# Patient Record
Sex: MALE | Race: BLACK OR AFRICAN AMERICAN | Employment: UNEMPLOYED | URBAN - METROPOLITAN AREA
[De-identification: names, ages, dates, MRNs, and addresses within clinical notes are randomized per-mention and may not be internally consistent; named-entity substitution may affect disease eponyms.]

---

## 2020-05-08 ENCOUNTER — HOSPITAL ENCOUNTER (INPATIENT)
Age: 35
LOS: 2 days | Discharge: HOME | DRG: 420 | End: 2020-05-10
Attending: EMERGENCY MEDICINE | Admitting: INTERNAL MEDICINE
Payer: COMMERCIAL

## 2020-05-08 PROBLEM — E10.10 DKA, TYPE 1, NOT AT GOAL (HCC): Status: ACTIVE | Noted: 2020-05-08

## 2020-05-08 LAB
-: ABNORMAL
-: NORMAL
ABSOLUTE EOS #: 0.07 K/UL (ref 0–0.4)
ABSOLUTE IMMATURE GRANULOCYTE: ABNORMAL K/UL (ref 0–0.3)
ABSOLUTE LYMPH #: 2.05 K/UL (ref 1–4.8)
ABSOLUTE MONO #: 0.59 K/UL (ref 0.1–1.3)
ALBUMIN SERPL-MCNC: ABNORMAL G/DL (ref 3.5–5.2)
ALBUMIN/GLOBULIN RATIO: ABNORMAL (ref 1–2.5)
ALP BLD-CCNC: ABNORMAL U/L (ref 40–129)
ALT SERPL-CCNC: ABNORMAL U/L (ref 5–41)
AMORPHOUS: ABNORMAL
ANION GAP SERPL CALCULATED.3IONS-SCNC: 17 MMOL/L (ref 9–17)
ANION GAP SERPL CALCULATED.3IONS-SCNC: 25 MMOL/L (ref 9–17)
AST SERPL-CCNC: ABNORMAL U/L
BACTERIA: ABNORMAL
BASOPHILS # BLD: 1 % (ref 0–2)
BASOPHILS ABSOLUTE: 0.07 K/UL (ref 0–0.2)
BETA-HYDROXYBUTYRATE: 4.29 MMOL/L (ref 0.02–0.27)
BILIRUB SERPL-MCNC: ABNORMAL MG/DL (ref 0.3–1.2)
BILIRUBIN URINE: NEGATIVE
BUN BLDV-MCNC: 10 MG/DL (ref 6–20)
BUN BLDV-MCNC: 10 MG/DL (ref 6–20)
BUN/CREAT BLD: ABNORMAL (ref 9–20)
BUN/CREAT BLD: ABNORMAL (ref 9–20)
CALCIUM SERPL-MCNC: 8.7 MG/DL (ref 8.6–10.4)
CALCIUM SERPL-MCNC: 9.8 MG/DL (ref 8.6–10.4)
CASTS UA: ABNORMAL /LPF
CHLORIDE BLD-SCNC: 101 MMOL/L (ref 98–107)
CHLORIDE BLD-SCNC: 95 MMOL/L (ref 98–107)
CO2: 15 MMOL/L (ref 20–31)
CO2: 15 MMOL/L (ref 20–31)
COLOR: YELLOW
COMMENT UA: ABNORMAL
CREAT SERPL-MCNC: 0.84 MG/DL (ref 0.7–1.2)
CREAT SERPL-MCNC: 1.15 MG/DL (ref 0.7–1.2)
CRYSTALS, UA: ABNORMAL /HPF
DIFFERENTIAL TYPE: ABNORMAL
EOSINOPHILS RELATIVE PERCENT: 1 % (ref 0–4)
EPITHELIAL CELLS UA: ABNORMAL /HPF
GFR AFRICAN AMERICAN: >60 ML/MIN
GFR AFRICAN AMERICAN: >60 ML/MIN
GFR NON-AFRICAN AMERICAN: >60 ML/MIN
GFR NON-AFRICAN AMERICAN: >60 ML/MIN
GFR SERPL CREATININE-BSD FRML MDRD: ABNORMAL ML/MIN/{1.73_M2}
GLUCOSE BLD-MCNC: 144 MG/DL (ref 75–110)
GLUCOSE BLD-MCNC: 145 MG/DL (ref 70–99)
GLUCOSE BLD-MCNC: 164 MG/DL (ref 75–110)
GLUCOSE BLD-MCNC: 164 MG/DL (ref 75–110)
GLUCOSE BLD-MCNC: 174 MG/DL (ref 75–110)
GLUCOSE BLD-MCNC: 177 MG/DL (ref 75–110)
GLUCOSE BLD-MCNC: 201 MG/DL
GLUCOSE BLD-MCNC: 201 MG/DL (ref 75–110)
GLUCOSE BLD-MCNC: 234 MG/DL (ref 75–110)
GLUCOSE BLD-MCNC: 306 MG/DL (ref 70–99)
GLUCOSE URINE: ABNORMAL
HCT VFR BLD CALC: 40.5 % (ref 41–53)
HEMOGLOBIN: 13.6 G/DL (ref 13.5–17.5)
IMMATURE GRANULOCYTES: ABNORMAL %
KETONES, URINE: ABNORMAL
LEUKOCYTE ESTERASE, URINE: ABNORMAL
LYMPHOCYTES # BLD: 31 % (ref 24–44)
MAGNESIUM: 1.9 MG/DL (ref 1.6–2.6)
MCH RBC QN AUTO: 26.3 PG (ref 26–34)
MCHC RBC AUTO-ENTMCNC: 33.6 G/DL (ref 31–37)
MCV RBC AUTO: 78.3 FL (ref 80–100)
MONOCYTES # BLD: 9 % (ref 1–7)
MORPHOLOGY: ABNORMAL
MUCUS: ABNORMAL
NITRITE, URINE: NEGATIVE
NRBC AUTOMATED: ABNORMAL PER 100 WBC
OTHER OBSERVATIONS UA: ABNORMAL
PDW BLD-RTO: 14.7 % (ref 11.5–14.9)
PH UA: 5.5 (ref 5–8)
PHOSPHORUS: NORMAL MG/DL (ref 2.5–4.5)
PLATELET # BLD: 281 K/UL (ref 150–450)
PLATELET ESTIMATE: ABNORMAL
PMV BLD AUTO: 9.1 FL (ref 6–12)
POTASSIUM SERPL-SCNC: 4.9 MMOL/L (ref 3.7–5.3)
POTASSIUM SERPL-SCNC: 6 MMOL/L (ref 3.7–5.3)
PROTEIN UA: ABNORMAL
RBC # BLD: 5.17 M/UL (ref 4.5–5.9)
RBC # BLD: ABNORMAL 10*6/UL
RBC UA: ABNORMAL /HPF
REASON FOR REJECTION: NORMAL
RENAL EPITHELIAL, UA: ABNORMAL /HPF
SEG NEUTROPHILS: 58 % (ref 36–66)
SEGMENTED NEUTROPHILS ABSOLUTE COUNT: 3.82 K/UL (ref 1.3–9.1)
SODIUM BLD-SCNC: 133 MMOL/L (ref 135–144)
SODIUM BLD-SCNC: 135 MMOL/L (ref 135–144)
SPECIFIC GRAVITY UA: 1.04 (ref 1–1.03)
TOTAL PROTEIN: ABNORMAL G/DL (ref 6.4–8.3)
TRICHOMONAS: ABNORMAL
TURBIDITY: ABNORMAL
URINE HGB: NEGATIVE
UROBILINOGEN, URINE: NORMAL
WBC # BLD: 6.6 K/UL (ref 3.5–11)
WBC # BLD: ABNORMAL 10*3/UL
WBC UA: ABNORMAL /HPF
YEAST: ABNORMAL
ZZ NTE CLEAN UP: ORDERED TEST: NORMAL
ZZ NTE WITH NAME CLEAN UP: SPECIMEN SOURCE: NORMAL

## 2020-05-08 PROCEDURE — 82947 ASSAY GLUCOSE BLOOD QUANT: CPT

## 2020-05-08 PROCEDURE — 2500000003 HC RX 250 WO HCPCS: Performed by: STUDENT IN AN ORGANIZED HEALTH CARE EDUCATION/TRAINING PROGRAM

## 2020-05-08 PROCEDURE — 83735 ASSAY OF MAGNESIUM: CPT

## 2020-05-08 PROCEDURE — 81001 URINALYSIS AUTO W/SCOPE: CPT

## 2020-05-08 PROCEDURE — 99285 EMERGENCY DEPT VISIT HI MDM: CPT

## 2020-05-08 PROCEDURE — 6360000002 HC RX W HCPCS: Performed by: PHYSICIAN ASSISTANT

## 2020-05-08 PROCEDURE — 6360000002 HC RX W HCPCS: Performed by: STUDENT IN AN ORGANIZED HEALTH CARE EDUCATION/TRAINING PROGRAM

## 2020-05-08 PROCEDURE — 6370000000 HC RX 637 (ALT 250 FOR IP): Performed by: EMERGENCY MEDICINE

## 2020-05-08 PROCEDURE — 85025 COMPLETE CBC W/AUTO DIFF WBC: CPT

## 2020-05-08 PROCEDURE — 82010 KETONE BODYS QUAN: CPT

## 2020-05-08 PROCEDURE — 2000000000 HC ICU R&B

## 2020-05-08 PROCEDURE — 87086 URINE CULTURE/COLONY COUNT: CPT

## 2020-05-08 PROCEDURE — 6370000000 HC RX 637 (ALT 250 FOR IP): Performed by: STUDENT IN AN ORGANIZED HEALTH CARE EDUCATION/TRAINING PROGRAM

## 2020-05-08 PROCEDURE — 99223 1ST HOSP IP/OBS HIGH 75: CPT | Performed by: INTERNAL MEDICINE

## 2020-05-08 PROCEDURE — 36415 COLL VENOUS BLD VENIPUNCTURE: CPT

## 2020-05-08 PROCEDURE — 84100 ASSAY OF PHOSPHORUS: CPT

## 2020-05-08 PROCEDURE — 2580000003 HC RX 258: Performed by: STUDENT IN AN ORGANIZED HEALTH CARE EDUCATION/TRAINING PROGRAM

## 2020-05-08 PROCEDURE — 2580000003 HC RX 258: Performed by: EMERGENCY MEDICINE

## 2020-05-08 PROCEDURE — 80048 BASIC METABOLIC PNL TOTAL CA: CPT

## 2020-05-08 PROCEDURE — 80053 COMPREHEN METABOLIC PANEL: CPT

## 2020-05-08 PROCEDURE — 83036 HEMOGLOBIN GLYCOSYLATED A1C: CPT

## 2020-05-08 RX ORDER — DEXTROSE MONOHYDRATE 25 G/50ML
12.5 INJECTION, SOLUTION INTRAVENOUS PRN
Status: DISCONTINUED | OUTPATIENT
Start: 2020-05-08 | End: 2020-05-10 | Stop reason: HOSPADM

## 2020-05-08 RX ORDER — 0.9 % SODIUM CHLORIDE 0.9 %
15 INTRAVENOUS SOLUTION INTRAVENOUS ONCE
Status: DISCONTINUED | OUTPATIENT
Start: 2020-05-08 | End: 2020-05-10 | Stop reason: HOSPADM

## 2020-05-08 RX ORDER — 0.9 % SODIUM CHLORIDE 0.9 %
1000 INTRAVENOUS SOLUTION INTRAVENOUS ONCE
Status: COMPLETED | OUTPATIENT
Start: 2020-05-08 | End: 2020-05-08

## 2020-05-08 RX ORDER — HYDRALAZINE HYDROCHLORIDE 20 MG/ML
10 INJECTION INTRAMUSCULAR; INTRAVENOUS EVERY 6 HOURS PRN
Status: DISCONTINUED | OUTPATIENT
Start: 2020-05-08 | End: 2020-05-09

## 2020-05-08 RX ORDER — DEXTROSE AND SODIUM CHLORIDE 5; .45 G/100ML; G/100ML
INJECTION, SOLUTION INTRAVENOUS CONTINUOUS PRN
Status: DISCONTINUED | OUTPATIENT
Start: 2020-05-08 | End: 2020-05-10 | Stop reason: HOSPADM

## 2020-05-08 RX ORDER — SODIUM CHLORIDE 9 MG/ML
INJECTION, SOLUTION INTRAVENOUS CONTINUOUS
Status: DISCONTINUED | OUTPATIENT
Start: 2020-05-08 | End: 2020-05-10 | Stop reason: HOSPADM

## 2020-05-08 RX ORDER — POTASSIUM CHLORIDE 7.45 MG/ML
10 INJECTION INTRAVENOUS PRN
Status: DISCONTINUED | OUTPATIENT
Start: 2020-05-08 | End: 2020-05-10 | Stop reason: HOSPADM

## 2020-05-08 RX ORDER — DEXTROSE MONOHYDRATE 50 MG/ML
100 INJECTION, SOLUTION INTRAVENOUS PRN
Status: DISCONTINUED | OUTPATIENT
Start: 2020-05-08 | End: 2020-05-10 | Stop reason: HOSPADM

## 2020-05-08 RX ORDER — NICOTINE POLACRILEX 4 MG
15 LOZENGE BUCCAL PRN
Status: DISCONTINUED | OUTPATIENT
Start: 2020-05-08 | End: 2020-05-10 | Stop reason: HOSPADM

## 2020-05-08 RX ORDER — ACETAMINOPHEN 325 MG/1
650 TABLET ORAL EVERY 4 HOURS PRN
Status: DISCONTINUED | OUTPATIENT
Start: 2020-05-08 | End: 2020-05-10 | Stop reason: HOSPADM

## 2020-05-08 RX ORDER — MAGNESIUM SULFATE 1 G/100ML
1 INJECTION INTRAVENOUS PRN
Status: DISCONTINUED | OUTPATIENT
Start: 2020-05-08 | End: 2020-05-10 | Stop reason: HOSPADM

## 2020-05-08 RX ADMIN — ENOXAPARIN SODIUM 30 MG: 30 INJECTION SUBCUTANEOUS at 20:26

## 2020-05-08 RX ADMIN — HYDRALAZINE HYDROCHLORIDE 10 MG: 20 INJECTION INTRAMUSCULAR; INTRAVENOUS at 23:00

## 2020-05-08 RX ADMIN — METRONIDAZOLE 2000 MG: 500 INJECTION, SOLUTION INTRAVENOUS at 20:26

## 2020-05-08 RX ADMIN — DEXTROSE AND SODIUM CHLORIDE: 5; 450 INJECTION, SOLUTION INTRAVENOUS at 18:29

## 2020-05-08 RX ADMIN — SODIUM CHLORIDE 0.1 UNITS/KG/HR: 9 INJECTION, SOLUTION INTRAVENOUS at 16:21

## 2020-05-08 RX ADMIN — SODIUM CHLORIDE 1000 ML: 9 INJECTION, SOLUTION INTRAVENOUS at 13:29

## 2020-05-08 RX ADMIN — Medication 3.12 UNITS/HR: at 18:29

## 2020-05-08 RX ADMIN — DEXTROSE MONOHYDRATE 100 ML/HR: 50 INJECTION, SOLUTION INTRAVENOUS at 16:21

## 2020-05-08 ASSESSMENT — ENCOUNTER SYMPTOMS
SORE THROAT: 0
COUGH: 0
FACIAL SWELLING: 0
RHINORRHEA: 0
CONSTIPATION: 0
CHEST TIGHTNESS: 0
EYE DISCHARGE: 0
EYE REDNESS: 0
BLOOD IN STOOL: 0
DIARRHEA: 0
EYE PAIN: 0
TROUBLE SWALLOWING: 0
VOMITING: 0
COLOR CHANGE: 0
BACK PAIN: 0
WHEEZING: 0
SINUS PRESSURE: 0
ABDOMINAL PAIN: 0
NAUSEA: 0
SHORTNESS OF BREATH: 0

## 2020-05-08 ASSESSMENT — PAIN SCALES - GENERAL: PAINLEVEL_OUTOF10: 0

## 2020-05-08 NOTE — H&P
1:24 PM   Result Value Ref Range    WBC 6.6 3.5 - 11.0 k/uL    RBC 5.17 4.5 - 5.9 m/uL    Hemoglobin 13.6 13.5 - 17.5 g/dL    Hematocrit 40.5 (L) 41 - 53 %    MCV 78.3 (L) 80 - 100 fL    MCH 26.3 26 - 34 pg    MCHC 33.6 31 - 37 g/dL    RDW 14.7 11.5 - 14.9 %    Platelets 030 719 - 637 k/uL    MPV 9.1 6.0 - 12.0 fL    NRBC Automated NOT REPORTED per 100 WBC    Differential Type NOT REPORTED     Immature Granulocytes NOT REPORTED 0 %    Absolute Immature Granulocyte NOT REPORTED 0.00 - 0.30 k/uL    WBC Morphology NOT REPORTED     RBC Morphology NOT REPORTED     Platelet Estimate NOT REPORTED     Seg Neutrophils 58 36 - 66 %    Lymphocytes 31 24 - 44 %    Monocytes 9 (H) 1 - 7 %    Eosinophils % 1 0 - 4 %    Basophils 1 0 - 2 %    Segs Absolute 3.82 1.3 - 9.1 k/uL    Absolute Lymph # 2.05 1.0 - 4.8 k/uL    Absolute Mono # 0.59 0.1 - 1.3 k/uL    Absolute Eos # 0.07 0.0 - 0.4 k/uL    Basophils Absolute 0.07 0.0 - 0.2 k/uL    Morphology FEW GIANT PLATELETS    Comprehensive Metabolic Panel    Collection Time: 05/08/20  1:24 PM   Result Value Ref Range    Glucose 306 (H) 70 - 99 mg/dL    BUN 10 6 - 20 mg/dL    CREATININE 1.15 0.70 - 1.20 mg/dL    Bun/Cre Ratio NOT REPORTED 9 - 20    Calcium 9.8 8.6 - 10.4 mg/dL    Sodium 135 135 - 144 mmol/L    Potassium 6.0 (HH) 3.7 - 5.3 mmol/L    Chloride 95 (L) 98 - 107 mmol/L    CO2 15 (L) 20 - 31 mmol/L    Anion Gap 25 (H) 9 - 17 mmol/L    Alkaline Phosphatase NOT REPORTED 40 - 129 U/L    ALT NOT REPORTED 5 - 41 U/L    AST NOT REPORTED <40 U/L    Total Bilirubin NOT REPORTED 0.3 - 1.2 mg/dL    Total Protein NOT REPORTED 6.4 - 8.3 g/dL    Alb NOT REPORTED 3.5 - 5.2 g/dL    Albumin/Globulin Ratio NOT REPORTED 1.0 - 2.5    GFR Non-African American >60 >60 mL/min    GFR African American >60 >60 mL/min    GFR Comment          GFR Staging NOT REPORTED    Beta-Hydroxybutyrate    Collection Time: 05/08/20  1:24 PM   Result Value Ref Range    Beta-Hydroxybutyrate 4.29 (H) 0.02 - 0.27 mmol/L POC Glucose Fingerstick    Collection Time: 05/08/20  4:12 PM   Result Value Ref Range    POC Glucose 234 (H) 75 - 110 mg/dL       Imaging/Diagnostics:  No results found. Assessment :      Primary Problem  DKA, type 1, not at goal [facility])    Active Hospital Problems    Diagnosis Date Noted    DKA, type 1, not at goal [facility]) [E10.10] 05/08/2020       Plan:     Patient status Admit as inpatient in the  Medical ICU    DKA  - CO 15, A. Gap 25, beta-hydroxybutyrate 4.29  - IVF per DKA protocol  - insulin gtt   - f/u HgbA1c  - NPO  - POC glucose  - hypoglycemia protocol  - f/u fasting lipid in AM    DVT ppx: lovenox 40 mg sq daily    Consultations:   IP CONSULT TO PRIMARY CARE PROVIDER  IP CONSULT TO SOCIAL WORK    Patient is admitted as inpatient status because of co-morbiditieslisted above, severity of signs and symptoms as outlined, requirement for current medical therapies and most importantly because of direct risk to patient if care not provided in a hospital setting. Festus Ham MD  5/8/2020  4:20 PM    Copy sent to Dr. Sethi primary care provider on file. Attending Physician Statement  I have discussed the care of Trace Browning and I have examined the patient myselft and taken ros and hpi , including pertinent history and exam findings,  with the resident. I have reviewed the key elements of all parts of the encounter with the resident. I agree with the assessment, plan and orders as documented by the resident.       Electronically signed by Marie Manriquez MD
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
## 2020-05-08 NOTE — PROGRESS NOTES
Medication History completed: The patient denies taking prescription medications.      Thank you,  Marvin Moreno, PharmD, BCPS  [phone]

## 2020-05-08 NOTE — ED NOTES
Mode of arrival (squad #, walk in, police, etc) : Walk in        Chief complaint(s): Urinary Frequency, Hyperglycemia        Arrival Note (brief scenario, treatment PTA, etc). : Pt arrived from home with complaints of urinary frequency and high blood sugar. Pt has no history of diabetes but states his father has diabetes. Pt states his father tested his BS yesterday and showed it was 421. Pt states he then took one of his fathers diabetic pills and it came down a little. Pt reports these symptoms are ongoing for the past 10 days.                 Danis Thacker RN  05/08/20 9227

## 2020-05-09 LAB
ALLEN TEST: ABNORMAL
ANION GAP SERPL CALCULATED.3IONS-SCNC: 12 MMOL/L (ref 9–17)
ANION GAP SERPL CALCULATED.3IONS-SCNC: 14 MMOL/L (ref 9–17)
ANION GAP SERPL CALCULATED.3IONS-SCNC: 16 MMOL/L (ref 9–17)
ANION GAP SERPL CALCULATED.3IONS-SCNC: 17 MMOL/L (ref 9–17)
ANION GAP SERPL CALCULATED.3IONS-SCNC: 18 MMOL/L (ref 9–17)
ANION GAP SERPL CALCULATED.3IONS-SCNC: 18 MMOL/L (ref 9–17)
BUN BLDV-MCNC: 3 MG/DL (ref 6–20)
BUN BLDV-MCNC: 4 MG/DL (ref 6–20)
BUN BLDV-MCNC: 6 MG/DL (ref 6–20)
BUN BLDV-MCNC: 8 MG/DL (ref 6–20)
BUN BLDV-MCNC: 9 MG/DL (ref 6–20)
BUN BLDV-MCNC: 9 MG/DL (ref 6–20)
BUN/CREAT BLD: ABNORMAL (ref 9–20)
CALCIUM SERPL-MCNC: 8.6 MG/DL (ref 8.6–10.4)
CALCIUM SERPL-MCNC: 8.7 MG/DL (ref 8.6–10.4)
CALCIUM SERPL-MCNC: 8.8 MG/DL (ref 8.6–10.4)
CALCIUM SERPL-MCNC: 8.9 MG/DL (ref 8.6–10.4)
CALCIUM SERPL-MCNC: 8.9 MG/DL (ref 8.6–10.4)
CALCIUM SERPL-MCNC: 9 MG/DL (ref 8.6–10.4)
CARBOXYHEMOGLOBIN: 2.6 % (ref 0–5)
CHLORIDE BLD-SCNC: 100 MMOL/L (ref 98–107)
CHLORIDE BLD-SCNC: 102 MMOL/L (ref 98–107)
CHLORIDE BLD-SCNC: 105 MMOL/L (ref 98–107)
CHLORIDE BLD-SCNC: 105 MMOL/L (ref 98–107)
CHOLESTEROL, FASTING: 120 MG/DL
CHOLESTEROL/HDL RATIO: 5.5
CO2: 14 MMOL/L (ref 20–31)
CO2: 15 MMOL/L (ref 20–31)
CO2: 18 MMOL/L (ref 20–31)
CREAT SERPL-MCNC: 0.66 MG/DL (ref 0.7–1.2)
CREAT SERPL-MCNC: 0.67 MG/DL (ref 0.7–1.2)
CREAT SERPL-MCNC: 0.71 MG/DL (ref 0.7–1.2)
CREAT SERPL-MCNC: 0.73 MG/DL (ref 0.7–1.2)
CREAT SERPL-MCNC: 0.77 MG/DL (ref 0.7–1.2)
CREAT SERPL-MCNC: 0.87 MG/DL (ref 0.7–1.2)
CULTURE: NORMAL
FIO2: ABNORMAL
GFR AFRICAN AMERICAN: >60 ML/MIN
GFR NON-AFRICAN AMERICAN: >60 ML/MIN
GFR SERPL CREATININE-BSD FRML MDRD: ABNORMAL ML/MIN/{1.73_M2}
GLUCOSE BLD-MCNC: 132 MG/DL (ref 75–110)
GLUCOSE BLD-MCNC: 138 MG/DL (ref 75–110)
GLUCOSE BLD-MCNC: 159 MG/DL (ref 70–99)
GLUCOSE BLD-MCNC: 159 MG/DL (ref 75–110)
GLUCOSE BLD-MCNC: 161 MG/DL (ref 75–110)
GLUCOSE BLD-MCNC: 162 MG/DL (ref 75–110)
GLUCOSE BLD-MCNC: 164 MG/DL (ref 70–99)
GLUCOSE BLD-MCNC: 164 MG/DL (ref 75–110)
GLUCOSE BLD-MCNC: 166 MG/DL (ref 75–110)
GLUCOSE BLD-MCNC: 170 MG/DL (ref 75–110)
GLUCOSE BLD-MCNC: 175 MG/DL (ref 75–110)
GLUCOSE BLD-MCNC: 175 MG/DL (ref 75–110)
GLUCOSE BLD-MCNC: 176 MG/DL (ref 70–99)
GLUCOSE BLD-MCNC: 176 MG/DL (ref 75–110)
GLUCOSE BLD-MCNC: 177 MG/DL (ref 70–99)
GLUCOSE BLD-MCNC: 187 MG/DL (ref 70–99)
GLUCOSE BLD-MCNC: 189 MG/DL (ref 75–110)
GLUCOSE BLD-MCNC: 190 MG/DL (ref 75–110)
GLUCOSE BLD-MCNC: 195 MG/DL (ref 75–110)
GLUCOSE BLD-MCNC: 197 MG/DL (ref 75–110)
GLUCOSE BLD-MCNC: 198 MG/DL (ref 70–99)
GLUCOSE BLD-MCNC: 203 MG/DL (ref 75–110)
GLUCOSE BLD-MCNC: 204 MG/DL (ref 75–110)
GLUCOSE BLD-MCNC: 215 MG/DL (ref 75–110)
GLUCOSE BLD-MCNC: 223 MG/DL (ref 75–110)
HCO3 VENOUS: 17.3 MMOL/L (ref 24–30)
HDLC SERPL-MCNC: 22 MG/DL
LDL CHOLESTEROL: ABNORMAL MG/DL (ref 0–130)
Lab: NORMAL
MAGNESIUM: 1.6 MG/DL (ref 1.6–2.6)
MAGNESIUM: 1.8 MG/DL (ref 1.6–2.6)
MAGNESIUM: 1.9 MG/DL (ref 1.6–2.6)
MAGNESIUM: 1.9 MG/DL (ref 1.6–2.6)
MAGNESIUM: 2 MG/DL (ref 1.6–2.6)
MAGNESIUM: 2.2 MG/DL (ref 1.6–2.6)
METHEMOGLOBIN: 1.4 % (ref 0–1.9)
MODE: ABNORMAL
NEGATIVE BASE EXCESS, VEN: 7.4 MMOL/L (ref 0–2)
NOTIFICATION TIME: ABNORMAL
NOTIFICATION: ABNORMAL
O2 DEVICE/FLOW/%: ABNORMAL
O2 SAT, VEN: 92.8 % (ref 60–85)
OXYHEMOGLOBIN: ABNORMAL % (ref 95–98)
PATIENT TEMP: ABNORMAL
PCO2, VEN, TEMP ADJ: ABNORMAL MMHG (ref 39–55)
PCO2, VEN: 27.4 (ref 39–55)
PEEP/CPAP: ABNORMAL
PH VENOUS: 7.41 (ref 7.32–7.42)
PH, VEN, TEMP ADJ: ABNORMAL (ref 7.32–7.42)
PHOSPHORUS: 2.6 MG/DL (ref 2.5–4.5)
PHOSPHORUS: 2.8 MG/DL (ref 2.5–4.5)
PHOSPHORUS: 3.2 MG/DL (ref 2.5–4.5)
PHOSPHORUS: 3.2 MG/DL (ref 2.5–4.5)
PHOSPHORUS: 3.3 MG/DL (ref 2.5–4.5)
PHOSPHORUS: 3.7 MG/DL (ref 2.5–4.5)
PO2, VEN, TEMP ADJ: ABNORMAL MMHG (ref 30–50)
PO2, VEN: 78 (ref 30–50)
POSITIVE BASE EXCESS, VEN: ABNORMAL MMOL/L (ref 0–2)
POTASSIUM SERPL-SCNC: 3.7 MMOL/L (ref 3.7–5.3)
POTASSIUM SERPL-SCNC: 4.4 MMOL/L (ref 3.7–5.3)
POTASSIUM SERPL-SCNC: 4.4 MMOL/L (ref 3.7–5.3)
POTASSIUM SERPL-SCNC: 4.5 MMOL/L (ref 3.7–5.3)
POTASSIUM SERPL-SCNC: 4.5 MMOL/L (ref 3.7–5.3)
POTASSIUM SERPL-SCNC: 4.7 MMOL/L (ref 3.7–5.3)
PSV: ABNORMAL
PT. POSITION: ABNORMAL
RESPIRATORY RATE: ABNORMAL
SAMPLE SITE: ABNORMAL
SET RATE: ABNORMAL
SODIUM BLD-SCNC: 131 MMOL/L (ref 135–144)
SODIUM BLD-SCNC: 133 MMOL/L (ref 135–144)
SODIUM BLD-SCNC: 133 MMOL/L (ref 135–144)
SODIUM BLD-SCNC: 134 MMOL/L (ref 135–144)
SPECIMEN DESCRIPTION: NORMAL
TEXT FOR RESPIRATORY: ABNORMAL
TOTAL HB: ABNORMAL G/DL (ref 12–16)
TOTAL RATE: ABNORMAL
TRIGLYCERIDE, FASTING: 436 MG/DL
VLDLC SERPL CALC-MCNC: ABNORMAL MG/DL (ref 1–30)
VT: ABNORMAL

## 2020-05-09 PROCEDURE — 84100 ASSAY OF PHOSPHORUS: CPT

## 2020-05-09 PROCEDURE — 94761 N-INVAS EAR/PLS OXIMETRY MLT: CPT

## 2020-05-09 PROCEDURE — 80048 BASIC METABOLIC PNL TOTAL CA: CPT

## 2020-05-09 PROCEDURE — 99233 SBSQ HOSP IP/OBS HIGH 50: CPT | Performed by: INTERNAL MEDICINE

## 2020-05-09 PROCEDURE — 83036 HEMOGLOBIN GLYCOSYLATED A1C: CPT

## 2020-05-09 PROCEDURE — 83735 ASSAY OF MAGNESIUM: CPT

## 2020-05-09 PROCEDURE — 80061 LIPID PANEL: CPT

## 2020-05-09 PROCEDURE — 2580000003 HC RX 258: Performed by: STUDENT IN AN ORGANIZED HEALTH CARE EDUCATION/TRAINING PROGRAM

## 2020-05-09 PROCEDURE — 82800 BLOOD PH: CPT

## 2020-05-09 PROCEDURE — 6370000000 HC RX 637 (ALT 250 FOR IP): Performed by: STUDENT IN AN ORGANIZED HEALTH CARE EDUCATION/TRAINING PROGRAM

## 2020-05-09 PROCEDURE — 2000000000 HC ICU R&B

## 2020-05-09 PROCEDURE — 36415 COLL VENOUS BLD VENIPUNCTURE: CPT

## 2020-05-09 PROCEDURE — 86200 CCP ANTIBODY: CPT

## 2020-05-09 PROCEDURE — 83721 ASSAY OF BLOOD LIPOPROTEIN: CPT

## 2020-05-09 PROCEDURE — 6360000002 HC RX W HCPCS: Performed by: STUDENT IN AN ORGANIZED HEALTH CARE EDUCATION/TRAINING PROGRAM

## 2020-05-09 PROCEDURE — 82805 BLOOD GASES W/O2 SATURATION: CPT

## 2020-05-09 RX ORDER — LISINOPRIL 5 MG/1
5 TABLET ORAL DAILY
Status: DISCONTINUED | OUTPATIENT
Start: 2020-05-09 | End: 2020-05-10 | Stop reason: HOSPADM

## 2020-05-09 RX ORDER — HYDRALAZINE HYDROCHLORIDE 20 MG/ML
5 INJECTION INTRAMUSCULAR; INTRAVENOUS EVERY 6 HOURS PRN
Status: DISCONTINUED | OUTPATIENT
Start: 2020-05-09 | End: 2020-05-10 | Stop reason: HOSPADM

## 2020-05-09 RX ORDER — FENOFIBRATE 54 MG/1
54 TABLET ORAL DAILY
Status: DISCONTINUED | OUTPATIENT
Start: 2020-05-09 | End: 2020-05-10 | Stop reason: HOSPADM

## 2020-05-09 RX ADMIN — POTASSIUM CHLORIDE 10 MEQ: 7.46 INJECTION, SOLUTION INTRAVENOUS at 08:00

## 2020-05-09 RX ADMIN — DEXTROSE AND SODIUM CHLORIDE: 5; 450 INJECTION, SOLUTION INTRAVENOUS at 17:22

## 2020-05-09 RX ADMIN — POTASSIUM CHLORIDE 10 MEQ: 7.46 INJECTION, SOLUTION INTRAVENOUS at 14:49

## 2020-05-09 RX ADMIN — FENOFIBRATE 54 MG: 54 TABLET ORAL at 10:50

## 2020-05-09 RX ADMIN — POTASSIUM CHLORIDE 10 MEQ: 7.46 INJECTION, SOLUTION INTRAVENOUS at 17:44

## 2020-05-09 RX ADMIN — MAGNESIUM SULFATE 1 G: 1 INJECTION INTRAVENOUS at 06:47

## 2020-05-09 RX ADMIN — ENOXAPARIN SODIUM 30 MG: 30 INJECTION SUBCUTANEOUS at 21:11

## 2020-05-09 RX ADMIN — DEXTROSE AND SODIUM CHLORIDE: 5; 450 INJECTION, SOLUTION INTRAVENOUS at 21:18

## 2020-05-09 RX ADMIN — LISINOPRIL 5 MG: 5 TABLET ORAL at 10:49

## 2020-05-09 RX ADMIN — POTASSIUM CHLORIDE 10 MEQ: 7.46 INJECTION, SOLUTION INTRAVENOUS at 19:25

## 2020-05-09 RX ADMIN — POTASSIUM CHLORIDE 10 MEQ: 7.46 INJECTION, SOLUTION INTRAVENOUS at 06:47

## 2020-05-09 RX ADMIN — POTASSIUM CHLORIDE 10 MEQ: 7.46 INJECTION, SOLUTION INTRAVENOUS at 09:41

## 2020-05-09 RX ADMIN — DEXTROSE AND SODIUM CHLORIDE: 5; 450 INJECTION, SOLUTION INTRAVENOUS at 00:47

## 2020-05-09 RX ADMIN — Medication 7.2 UNITS/HR: at 09:41

## 2020-05-09 RX ADMIN — DEXTROSE AND SODIUM CHLORIDE: 5; 450 INJECTION, SOLUTION INTRAVENOUS at 06:19

## 2020-05-09 RX ADMIN — POTASSIUM CHLORIDE 10 MEQ: 7.46 INJECTION, SOLUTION INTRAVENOUS at 10:49

## 2020-05-09 RX ADMIN — POTASSIUM CHLORIDE 10 MEQ: 7.46 INJECTION, SOLUTION INTRAVENOUS at 15:59

## 2020-05-09 RX ADMIN — POTASSIUM CHLORIDE 10 MEQ: 7.46 INJECTION, SOLUTION INTRAVENOUS at 12:10

## 2020-05-09 RX ADMIN — DEXTROSE AND SODIUM CHLORIDE: 5; 450 INJECTION, SOLUTION INTRAVENOUS at 12:10

## 2020-05-09 RX ADMIN — POTASSIUM CHLORIDE 10 MEQ: 7.46 INJECTION, SOLUTION INTRAVENOUS at 23:36

## 2020-05-09 RX ADMIN — MAGNESIUM SULFATE 1 G: 1 INJECTION INTRAVENOUS at 08:06

## 2020-05-09 ASSESSMENT — PAIN SCALES - GENERAL
PAINLEVEL_OUTOF10: 0

## 2020-05-09 ASSESSMENT — ENCOUNTER SYMPTOMS
NAUSEA: 0
CONSTIPATION: 0
SHORTNESS OF BREATH: 0
VOMITING: 0
TROUBLE SWALLOWING: 0
COUGH: 0
WHEEZING: 0
ABDOMINAL PAIN: 0
DIARRHEA: 0

## 2020-05-09 NOTE — PLAN OF CARE
Problem: Fluid Volume - Imbalance:  Goal: Will remain free of signs and symptoms of dehydration  Description: Will remain free of signs and symptoms of dehydration  5/9/2020 1610 by Javier Montgomery RN  Outcome: Ongoing  5/9/2020 0212 by Mannie Galeazzi, RN  Outcome: Ongoing  Goal: Absence of imbalanced fluid volume signs and symptoms  Description: Absence of imbalanced fluid volume signs and symptoms  5/9/2020 1610 by Javier Montgomery RN  Outcome: Ongoing  5/9/2020 0212 by Mannie Galeazzi, RN  Outcome: Ongoing     Problem: Serum Glucose Level - Abnormal:  Goal: Ability to maintain appropriate glucose levels will improve  Description: Ability to maintain appropriate glucose levels will improve  5/9/2020 1610 by Javier Montgomery RN  Outcome: Ongoing  5/9/2020 0212 by Mannie Galeazzi, RN  Outcome: Ongoing  Goal: Ability to maintain appropriate glucose levels will improve to within specified parameters  Description: Ability to maintain appropriate glucose levels will improve to within specified parameters  5/9/2020 1610 by Javier Montgomery RN  Outcome: Ongoing  5/9/2020 0212 by Mannie Galeazzi, RN  Outcome: Ongoing     Problem: Injury - Acid Base Imbalance:  Goal: Acid-base balance  Description: Acid-base balance  5/9/2020 1610 by Javier Montgomery RN  Outcome: Ongoing  5/9/2020 0212 by Mannie Galeazzi, RN  Outcome: Ongoing     Problem: Discharge Planning:  Goal: Participates in care planning  Description: Participates in care planning  5/9/2020 1610 by Javier Montgomery RN  Outcome: Ongoing  5/9/2020 0212 by Mannie Galeazzi, RN  Outcome: Ongoing  Goal: Discharged to appropriate level of care  Description: Discharged to appropriate level of care  5/9/2020 1610 by Javier Montgomery RN  Outcome: Ongoing  5/9/2020 0212 by Mannie Galeazzi, RN  Outcome: Ongoing     Problem: Anxiety/Stress:  Goal: Level of anxiety will decrease  Description: Level of anxiety will

## 2020-05-09 NOTE — PROGRESS NOTES
dextrose, dextrose, potassium chloride, magnesium sulfate, sodium phosphate IVPB **OR** sodium phosphate IVPB **OR** sodium phosphate IVPB, dextrose 5 % and 0.45 % NaCl, hydrALAZINE, acetaminophen    Data:     Past Medical History:   has no past medical history on file. Social History:   reports that he quit smoking 7 days ago. His smoking use included cigarettes. He has never used smokeless tobacco. He reports that he does not drink alcohol or use drugs. Family History: History reviewed. No pertinent family history. Vitals:  /60 Comment: Simultaneous filing. User may not have seen previous data. Pulse 85   Temp 98.5 °F (36.9 °C) (Oral)   Resp 19   Ht 5' 7\" (1.702 m)   Wt 260 lb 5.8 oz (118.1 kg)   SpO2 99% Comment: Simultaneous filing. User may not have seen previous data. BMI 40.78 kg/m²   Temp (24hrs), Av.4 °F (36.9 °C), Min:98.1 °F (36.7 °C), Max:98.9 °F (37.2 °C)    Recent Labs     20  2348 20  0049 20  0201 20  0258   POCGLU 176* 203* 215* 223*       I/O(24Hr): Intake/Output Summary (Last 24 hours) at 2020 0740  Last data filed at 2020 0600  Gross per 24 hour   Intake 2271.92 ml   Output 300 ml   Net 1971.92 ml       Labs:  [unfilled]    No results found for: SPECIAL  No results found for: CULTURE    @[facility]@    Radiology:    No results found. Physical Examination:        Physical Exam  Constitutional:       General: He is not in acute distress. Appearance: He is well-developed. He is not diaphoretic. Cardiovascular:      Rate and Rhythm: Normal rate and regular rhythm. Heart sounds: Normal heart sounds. No murmur. Pulmonary:      Effort: Pulmonary effort is normal. No respiratory distress. Breath sounds: Normal breath sounds. No stridor. No wheezing. Chest:      Chest wall: No tenderness. Abdominal:      General: Bowel sounds are normal. There is no distension. Palpations: Abdomen is soft.       Tenderness: There is no abdominal tenderness. There is no guarding or rebound. Skin:     General: Skin is warm. Capillary Refill: Capillary refill takes less than 2 seconds. Neurological:      Mental Status: He is alert and oriented to person, place, and time. Assessment:        Primary Problem  DKA, type 1, not at goal [facility])    Active Hospital Problems    Diagnosis Date Noted    DKA, type 1, not at goal [facility]) [E10.10] 05/08/2020       Plan:        DKA secondary to newly diagnosed diabetes  -NPO currently   -DKA protocol started  -BMP q4h, initial Mag and Phos levels  -Glucose checks every hour  -IV Normal Saline at 250 mL/hr  -Insulin Regular started at 0.1 Units/kg/hr  -D5 and 0.45% NS at 150 mL/hr when blood glucose less than 250 mg/dL  -Will monitor bicarb and anion gap, bridge with lantus and begin diet PO when bicarb >15 and gap closed x 2 measurements   -Discontinue Insulin drip 2 hours post PO intake. -Hypoglycemia, phos and potassium replacement protocols in place  -Hemoglobin A1c pending this morning  -Diabetes education and dietitian consulted     Hypertriglyceridemia secondary to diet and obesity  Triglycerides 436  Started fenofibrate    Hypertension  Lisinopril 5 mg  Hydralazine 5 mg as needed    DVT prophylaxis: Lovenox  PT/OT    Margaret Najjar, MD  5/9/2020  7:40 AM   Attending Physician Statement  I have discussed the care of Adilene Gonsalves and I have examined the patient myselft and taken ros and hpi , including pertinent history and exam findings,  with the resident. I have reviewed the key elements of all parts of the encounter with the resident. I agree with the assessment, plan and orders as documented by the resident.     dka   New dm  Gap not closed  Morbid obese  Keep in icu   Drip insuin til gap closed    Electronically signed by Gentry Shirley MD

## 2020-05-10 VITALS
RESPIRATION RATE: 23 BRPM | HEART RATE: 98 BPM | OXYGEN SATURATION: 97 % | HEIGHT: 67 IN | TEMPERATURE: 98.8 F | WEIGHT: 264.11 LBS | SYSTOLIC BLOOD PRESSURE: 148 MMHG | DIASTOLIC BLOOD PRESSURE: 92 MMHG | BODY MASS INDEX: 41.45 KG/M2

## 2020-05-10 PROBLEM — I10 ESSENTIAL HYPERTENSION: Status: ACTIVE | Noted: 2020-05-10

## 2020-05-10 PROBLEM — E66.01 MORBID OBESITY (HCC): Status: ACTIVE | Noted: 2020-05-10

## 2020-05-10 LAB
ALLEN TEST: ABNORMAL
ANION GAP SERPL CALCULATED.3IONS-SCNC: 13 MMOL/L (ref 9–17)
ANION GAP SERPL CALCULATED.3IONS-SCNC: 14 MMOL/L (ref 9–17)
ANION GAP SERPL CALCULATED.3IONS-SCNC: 14 MMOL/L (ref 9–17)
ANION GAP SERPL CALCULATED.3IONS-SCNC: 15 MMOL/L (ref 9–17)
ANION GAP SERPL CALCULATED.3IONS-SCNC: 16 MMOL/L (ref 9–17)
BUN BLDV-MCNC: 2 MG/DL (ref 6–20)
BUN BLDV-MCNC: 3 MG/DL (ref 6–20)
BUN BLDV-MCNC: 3 MG/DL (ref 6–20)
BUN BLDV-MCNC: <2 MG/DL (ref 6–20)
BUN BLDV-MCNC: <2 MG/DL (ref 6–20)
BUN/CREAT BLD: ABNORMAL (ref 9–20)
CALCIUM IONIZED: 1.32 MMOL/L (ref 1.13–1.33)
CALCIUM SERPL-MCNC: 8.3 MG/DL (ref 8.6–10.4)
CALCIUM SERPL-MCNC: 8.7 MG/DL (ref 8.6–10.4)
CALCIUM SERPL-MCNC: 8.8 MG/DL (ref 8.6–10.4)
CALCIUM SERPL-MCNC: 8.9 MG/DL (ref 8.6–10.4)
CALCIUM SERPL-MCNC: 9 MG/DL (ref 8.6–10.4)
CARBOXYHEMOGLOBIN: 2 % (ref 0–5)
CHLORIDE BLD-SCNC: 103 MMOL/L (ref 98–107)
CHLORIDE BLD-SCNC: 105 MMOL/L (ref 98–107)
CHLORIDE BLD-SCNC: 106 MMOL/L (ref 98–107)
CHLORIDE BLD-SCNC: 107 MMOL/L (ref 98–107)
CHLORIDE BLD-SCNC: 107 MMOL/L (ref 98–107)
CO2: 13 MMOL/L (ref 20–31)
CO2: 15 MMOL/L (ref 20–31)
CO2: 15 MMOL/L (ref 20–31)
CO2: 16 MMOL/L (ref 20–31)
CO2: 17 MMOL/L (ref 20–31)
CREAT SERPL-MCNC: 0.64 MG/DL (ref 0.7–1.2)
CREAT SERPL-MCNC: 0.68 MG/DL (ref 0.7–1.2)
CREAT SERPL-MCNC: 0.7 MG/DL (ref 0.7–1.2)
CREAT SERPL-MCNC: 0.73 MG/DL (ref 0.7–1.2)
CREAT SERPL-MCNC: 0.76 MG/DL (ref 0.7–1.2)
ESTIMATED AVERAGE GLUCOSE: 258 MG/DL
ESTIMATED AVERAGE GLUCOSE: 295 MG/DL
FIO2: ABNORMAL
GFR AFRICAN AMERICAN: >60 ML/MIN
GFR NON-AFRICAN AMERICAN: >60 ML/MIN
GFR SERPL CREATININE-BSD FRML MDRD: ABNORMAL ML/MIN/{1.73_M2}
GLUCOSE BLD-MCNC: 133 MG/DL (ref 75–110)
GLUCOSE BLD-MCNC: 146 MG/DL (ref 75–110)
GLUCOSE BLD-MCNC: 146 MG/DL (ref 75–110)
GLUCOSE BLD-MCNC: 165 MG/DL (ref 70–99)
GLUCOSE BLD-MCNC: 175 MG/DL (ref 75–110)
GLUCOSE BLD-MCNC: 184 MG/DL (ref 75–110)
GLUCOSE BLD-MCNC: 191 MG/DL (ref 70–99)
GLUCOSE BLD-MCNC: 192 MG/DL (ref 75–110)
GLUCOSE BLD-MCNC: 208 MG/DL (ref 70–99)
GLUCOSE BLD-MCNC: 208 MG/DL (ref 75–110)
GLUCOSE BLD-MCNC: 214 MG/DL (ref 75–110)
GLUCOSE BLD-MCNC: 218 MG/DL (ref 70–99)
GLUCOSE BLD-MCNC: 218 MG/DL (ref 75–110)
GLUCOSE BLD-MCNC: 221 MG/DL (ref 75–110)
GLUCOSE BLD-MCNC: 229 MG/DL (ref 75–110)
GLUCOSE BLD-MCNC: 92 MG/DL (ref 70–99)
HBA1C MFR BLD: 10.6 % (ref 4–6)
HBA1C MFR BLD: 11.9 % (ref 4–6)
HCO3 VENOUS: 20.5 MMOL/L (ref 24–30)
MAGNESIUM: 1.8 MG/DL (ref 1.6–2.6)
MAGNESIUM: 1.9 MG/DL (ref 1.6–2.6)
MAGNESIUM: 2 MG/DL (ref 1.6–2.6)
METHEMOGLOBIN: 1.2 % (ref 0–1.9)
MODE: ABNORMAL
NEGATIVE BASE EXCESS, VEN: 5.2 MMOL/L (ref 0–2)
NOTIFICATION TIME: ABNORMAL
NOTIFICATION: ABNORMAL
O2 DEVICE/FLOW/%: ABNORMAL
O2 SAT, VEN: 67.7 % (ref 60–85)
OXYHEMOGLOBIN: ABNORMAL % (ref 95–98)
PATIENT TEMP: ABNORMAL
PCO2, VEN, TEMP ADJ: ABNORMAL MMHG (ref 39–55)
PCO2, VEN: 37.6 (ref 39–55)
PEEP/CPAP: ABNORMAL
PH VENOUS: 7.34 (ref 7.32–7.42)
PH, VEN, TEMP ADJ: ABNORMAL (ref 7.32–7.42)
PHOSPHORUS: 2.6 MG/DL (ref 2.5–4.5)
PHOSPHORUS: 2.9 MG/DL (ref 2.5–4.5)
PHOSPHORUS: 3 MG/DL (ref 2.5–4.5)
PHOSPHORUS: 3.4 MG/DL (ref 2.5–4.5)
PHOSPHORUS: 3.4 MG/DL (ref 2.5–4.5)
PO2, VEN, TEMP ADJ: ABNORMAL MMHG (ref 30–50)
PO2, VEN: 36 (ref 30–50)
POSITIVE BASE EXCESS, VEN: ABNORMAL MMOL/L (ref 0–2)
POTASSIUM SERPL-SCNC: 3.7 MMOL/L (ref 3.7–5.3)
POTASSIUM SERPL-SCNC: 4.1 MMOL/L (ref 3.7–5.3)
POTASSIUM SERPL-SCNC: 4.1 MMOL/L (ref 3.7–5.3)
POTASSIUM SERPL-SCNC: 4.2 MMOL/L (ref 3.7–5.3)
POTASSIUM SERPL-SCNC: 4.5 MMOL/L (ref 3.7–5.3)
PSV: ABNORMAL
PT. POSITION: ABNORMAL
RESPIRATORY RATE: ABNORMAL
SAMPLE SITE: ABNORMAL
SET RATE: ABNORMAL
SODIUM BLD-SCNC: 132 MMOL/L (ref 135–144)
SODIUM BLD-SCNC: 135 MMOL/L (ref 135–144)
SODIUM BLD-SCNC: 135 MMOL/L (ref 135–144)
SODIUM BLD-SCNC: 136 MMOL/L (ref 135–144)
SODIUM BLD-SCNC: 138 MMOL/L (ref 135–144)
TEXT FOR RESPIRATORY: ABNORMAL
TOTAL HB: ABNORMAL G/DL (ref 12–16)
TOTAL RATE: ABNORMAL
VT: ABNORMAL

## 2020-05-10 PROCEDURE — 82330 ASSAY OF CALCIUM: CPT

## 2020-05-10 PROCEDURE — 84100 ASSAY OF PHOSPHORUS: CPT

## 2020-05-10 PROCEDURE — 6360000002 HC RX W HCPCS: Performed by: STUDENT IN AN ORGANIZED HEALTH CARE EDUCATION/TRAINING PROGRAM

## 2020-05-10 PROCEDURE — 2580000003 HC RX 258: Performed by: STUDENT IN AN ORGANIZED HEALTH CARE EDUCATION/TRAINING PROGRAM

## 2020-05-10 PROCEDURE — 6370000000 HC RX 637 (ALT 250 FOR IP): Performed by: STUDENT IN AN ORGANIZED HEALTH CARE EDUCATION/TRAINING PROGRAM

## 2020-05-10 PROCEDURE — 80048 BASIC METABOLIC PNL TOTAL CA: CPT

## 2020-05-10 PROCEDURE — 83735 ASSAY OF MAGNESIUM: CPT

## 2020-05-10 PROCEDURE — 94761 N-INVAS EAR/PLS OXIMETRY MLT: CPT

## 2020-05-10 PROCEDURE — 2500000003 HC RX 250 WO HCPCS: Performed by: STUDENT IN AN ORGANIZED HEALTH CARE EDUCATION/TRAINING PROGRAM

## 2020-05-10 PROCEDURE — 82805 BLOOD GASES W/O2 SATURATION: CPT

## 2020-05-10 PROCEDURE — 36415 COLL VENOUS BLD VENIPUNCTURE: CPT

## 2020-05-10 PROCEDURE — 82800 BLOOD PH: CPT

## 2020-05-10 PROCEDURE — 82947 ASSAY GLUCOSE BLOOD QUANT: CPT

## 2020-05-10 PROCEDURE — 99233 SBSQ HOSP IP/OBS HIGH 50: CPT | Performed by: INTERNAL MEDICINE

## 2020-05-10 RX ORDER — BLOOD-GLUCOSE METER
1 KIT MISCELLANEOUS DAILY
Qty: 1 KIT | Refills: 0 | Status: SHIPPED | OUTPATIENT
Start: 2020-05-10

## 2020-05-10 RX ORDER — INSULIN GLARGINE 100 [IU]/ML
10 INJECTION, SOLUTION SUBCUTANEOUS ONCE
Status: COMPLETED | OUTPATIENT
Start: 2020-05-10 | End: 2020-05-10

## 2020-05-10 RX ORDER — INSULIN GLARGINE 100 [IU]/ML
10 INJECTION, SOLUTION SUBCUTANEOUS NIGHTLY
Qty: 5 PEN | Refills: 0 | Status: SHIPPED | OUTPATIENT
Start: 2020-05-10 | End: 2020-09-30 | Stop reason: SDUPTHER

## 2020-05-10 RX ORDER — GLUCOSAMINE HCL/CHONDROITIN SU 500-400 MG
CAPSULE ORAL
Qty: 100 STRIP | Refills: 1 | Status: SHIPPED | OUTPATIENT
Start: 2020-05-10

## 2020-05-10 RX ORDER — FENOFIBRATE 54 MG/1
54 TABLET ORAL DAILY
Qty: 30 TABLET | Refills: 3 | Status: SHIPPED | OUTPATIENT
Start: 2020-05-11 | End: 2020-10-05

## 2020-05-10 RX ORDER — LISINOPRIL 5 MG/1
5 TABLET ORAL DAILY
Qty: 30 TABLET | Refills: 3 | Status: SHIPPED | OUTPATIENT
Start: 2020-05-11 | End: 2020-10-05

## 2020-05-10 RX ADMIN — ENOXAPARIN SODIUM 30 MG: 30 INJECTION SUBCUTANEOUS at 10:44

## 2020-05-10 RX ADMIN — Medication 0.1 UNITS/KG/HR: at 05:10

## 2020-05-10 RX ADMIN — POTASSIUM CHLORIDE 10 MEQ: 7.46 INJECTION, SOLUTION INTRAVENOUS at 01:01

## 2020-05-10 RX ADMIN — POTASSIUM CHLORIDE 10 MEQ: 7.46 INJECTION, SOLUTION INTRAVENOUS at 08:33

## 2020-05-10 RX ADMIN — FENOFIBRATE 54 MG: 54 TABLET ORAL at 10:44

## 2020-05-10 RX ADMIN — POTASSIUM CHLORIDE 10 MEQ: 7.46 INJECTION, SOLUTION INTRAVENOUS at 07:02

## 2020-05-10 RX ADMIN — DEXTROSE AND SODIUM CHLORIDE: 5; 450 INJECTION, SOLUTION INTRAVENOUS at 11:24

## 2020-05-10 RX ADMIN — DEXTROSE AND SODIUM CHLORIDE: 5; 450 INJECTION, SOLUTION INTRAVENOUS at 07:45

## 2020-05-10 RX ADMIN — POTASSIUM CHLORIDE 10 MEQ: 7.46 INJECTION, SOLUTION INTRAVENOUS at 05:54

## 2020-05-10 RX ADMIN — DEXTROSE AND SODIUM CHLORIDE: 5; 450 INJECTION, SOLUTION INTRAVENOUS at 04:17

## 2020-05-10 RX ADMIN — INSULIN GLARGINE 10 UNITS: 100 INJECTION, SOLUTION SUBCUTANEOUS at 13:18

## 2020-05-10 RX ADMIN — SODIUM PHOSPHATE, MONOBASIC, MONOHYDRATE 10 MMOL: 276; 142 INJECTION, SOLUTION INTRAVENOUS at 00:09

## 2020-05-10 RX ADMIN — LISINOPRIL 5 MG: 5 TABLET ORAL at 10:44

## 2020-05-10 RX ADMIN — DEXTROSE AND SODIUM CHLORIDE: 5; 450 INJECTION, SOLUTION INTRAVENOUS at 01:00

## 2020-05-10 ASSESSMENT — ENCOUNTER SYMPTOMS
CONSTIPATION: 0
WHEEZING: 0
COUGH: 0
SHORTNESS OF BREATH: 0
NAUSEA: 0
VOMITING: 0
DIARRHEA: 0
TROUBLE SWALLOWING: 0
ABDOMINAL PAIN: 0

## 2020-05-10 ASSESSMENT — PAIN SCALES - GENERAL
PAINLEVEL_OUTOF10: 0
PAINLEVEL_OUTOF10: 0

## 2020-05-10 NOTE — CARE COORDINATION
ONGOING DISCHARGE PLAN:    Spoke with patient regarding discharge plan and patient confirms that plan is still to return home wo [facility]. Pt is new diabetic. NPO    Insulin gtt continues. Glucose 208    Will continue to follow for additional discharge needs.     Electronically signed by Ander Jones RN on 5/10/2020 at 12:15 PM

## 2020-05-10 NOTE — PROGRESS NOTES
Pt c/o pain when flushing PIV in L AC. Educated pt on the need for rotating IV sites. Pt verbalized understanding. Two attempts to start a PIV were made, one by myself and the other by Latonya Ramos RN. Both attempts were unsuccessful. Pt requested that sodium phosphate be connected to and infuse via the line he was complaining about. The line has a sluggish blood return and flushes without resistance. Pt verbalized understanding and possible complications.

## 2020-05-10 NOTE — DISCHARGE INSTR - COC
9           Discharging to Facility/ Agency   · Name:   · Address:  · Phone:  · Fax:    Dialysis Facility (if applicable)   · Name:  · Address:  · Dialysis Schedule:  · Phone:  · Fax:    / signature: {Esignature:[id number]}    PHYSICIAN SECTION    Prognosis: {Prognosis:5978819272}    Condition at Discharge: Davin Acosta Patient Condition:989016682}    Rehab Potential (if transferring to Rehab): {Prognosis:6411388093}    Recommended Labs or Other Treatments After Discharge: ***    Physician Certification: I certify the above information and transfer of Zacarias Acevedo  is necessary for the continuing treatment of the diagnosis listed and that he requires {Admit to Appropriate Level of Care:35627} for {GREATER/LESS:658088564} 30 days.      Update Admission H&P: {CHP DME Changes in XFFTA:172165970}    PHYSICIAN SIGNATURE:  {Esignature:[id number]}

## 2020-05-10 NOTE — DISCHARGE SUMMARY
[facility]    Discharge Summary     Patient ID: Alexandre Barraza  :  1985   MRN: [id number]     ACCOUNT:  [de-identified]   Patient's PCP: No primary care provider on file. Admit Date: 2020   Discharge Date: 5/10/2020   Length of Stay: 2  Code Status:  Full Code  Admitting Physician: Colletta Heritage, MD  Discharge Physician: Jules Valladares MD     Active Discharge Diagnoses:       Primary Problem  DKA, type 1, not at goal [facility])      [place] Problems    Diagnosis Date Noted    Morbid obesity ([facility] [address] [E66.01] 05/10/2020    Essential hypertension [I10] 05/10/2020    DKA, type 1, not at goal [facility]) [E10.10] 2020       Admission Condition:  good     Discharged Condition: good    Hospital Stay:       Hospital Course:  Alexandre Barraza is a 27 y.o. male who was admitted for the management of   DKA, type 1, not at goal [facility]) , presented to ER with Urinary Frequency (for the past 7-10 days); Blurred Vision; and Hyperglycemia (father checked his blood sugar yesterday & it was 421. Patient reports that he took one of his dad's diabetic pills and his repeat blood sugar was 341.)    Patient came into the emergency department with DKA newly diagnosed type 2 diabetes. Patient's hemoglobin A1c 11.9. Patient was started on the DKA protocol anion gap and bicarb close x2 patient was bridged with Lantus 10 units. On the day of discharge patient is no longer having abdominal pain, nausea, or vomiting. Patient tolerated his diet well.        Significant therapeutic interventions:    Significant Diagnostic Studies:   Labs / Micro:  CBC:   Lab Results   Component Value Date    WBC 6.6 2020    RBC 5.17 2020    HGB 13.6 2020    HCT 40.5 2020    MCV 78.3 2020    MCH 26.3 2020    MCHC 33.6 2020    RDW 14.7 2020     2020     BMP:    Lab Results   Component Value Date GLUCOSE 92 05/10/2020     05/10/2020    K 4.1 05/10/2020     05/10/2020    CO2 17 05/10/2020    ANIONGAP 14 05/10/2020    BUN 3 05/10/2020    CREATININE 0.76 05/10/2020    BUNCRER NOT REPORTED 05/10/2020    CALCIUM 9.0 05/10/2020    LABGLOM >60 05/10/2020    GFRAA >60 05/10/2020    GFR      05/10/2020    GFR NOT REPORTED 05/10/2020       Radiology:  No results found. Consultations:    Consults:     Final Specialist Recommendations/Findings:   IP CONSULT TO PRIMARY CARE PROVIDER  IP CONSULT TO SOCIAL WORK  IP CONSULT TO DIABETES EDUCATOR  IP CONSULT TO DIETITIAN      The patient was seen and examined on day of discharge and this discharge summary is in conjunction with any daily progress note from day of discharge. Discharge plan:       Disposition: Home    Physician Follow Up:     Josy Douglas MD  [address]  [phone]    In 1 week  hospital follow up new diagnosis of DM       Requiring Further Evaluation/Follow Up POST HOSPITALIZATION/Incidental Findings:   Newly diagnosed type 2 diabetes  Started on fenofibrate due to elevated triglycerides  Started on lisinopril 5 mg    Diet: diabetic diet    Activity: As tolerated    Instructions to Patient:   Follow-up with PCP in 7 to 14 days  Follow up for diabetes education outpatient  Symptoms persist or worsen to return to the emergency department      Discharge Medications:      Medication List      START taking these medications    blood glucose test strips  Test 1 times a day & as needed for symptoms of irregular blood glucose. fenofibrate 54 MG tablet  Commonly known as:  TRICOR  Take 1 tablet by mouth daily [facility]: Please dispense generic fenofibrate unless prescriber denote  Start taking on:   May 11, 2020     glucose monitoring kit monitoring kit  1 kit by Does not apply route daily     Lantus SoloStar 100 UNIT/ML injection pen  Generic drug:  insulin glargine  Inject 10 Units into the

## 2020-05-10 NOTE — PLAN OF CARE
Problem: Fluid Volume - Imbalance:  Goal: Will remain free of signs and symptoms of dehydration  Description: Will remain free of signs and symptoms of dehydration  5/10/2020 1658 by Lieutenant Vipul RN  Outcome: Met This Shift  5/10/2020 0312 by Shon López RN  Outcome: Ongoing  Goal: Absence of imbalanced fluid volume signs and symptoms  Description: Absence of imbalanced fluid volume signs and symptoms  5/10/2020 1658 by Lieutenant Vipul RN  Outcome: Met This Shift  5/10/2020 0312 by Shon López RN  Outcome: Ongoing     Problem: Discharge Planning:  Goal: Participates in care planning  Description: Participates in care planning  5/10/2020 1658 by Lieutenant Vipul RN  Outcome: Met This Shift  5/10/2020 0312 by Shon López RN  Outcome: Ongoing     Problem: Anxiety/Stress:  Goal: Level of anxiety will decrease  Description: Level of anxiety will decrease  5/10/2020 1658 by Lieutenant Vipul RN  Outcome: Met This Shift  5/10/2020 0312 by Shon López RN  Outcome: Ongoing     Problem: Cardiac Output - Decreased:  Goal: Hemodynamic stability will improve  Description: Hemodynamic stability will improve  5/10/2020 1658 by Lieutenant Vipul RN  Outcome: Met This Shift  5/10/2020 0312 by Shon López RN  Outcome: Ongoing     Problem: Nutrition Deficit:  Goal: Ability to achieve adequate nutritional intake will improve  Description: Ability to achieve adequate nutritional intake will improve  5/10/2020 1658 by Lieutenant Vipul RN  Outcome: Met This Shift  5/10/2020 0312 by Shon López RN  Outcome: Ongoing

## 2020-05-10 NOTE — PROGRESS NOTES
[address] [facility]    PROGRESS NOTE             5/10/2020    8:44 AM    Name:   Mark Vargas  MRN:     [id number]     Acct:      [de-identified]   Room:   2008/2008-01  IP Day:  2  Admit Date:  5/8/2020 12:55 PM    PCP:  No primary care provider on file. Code Status:  Full Code    Subjective:     C/C:   Chief Complaint   Patient presents with    Urinary Frequency     for the past 7-10 days    Blurred Vision    Hyperglycemia     father checked his blood sugar yesterday & it was 421. Patient reports that he took one of his dad's diabetic pills and his repeat blood sugar was 341. Interval History Status: improved. Patient was seen and examined at bedside this morning no acute overnight changes. Overnight patient still has not had bicarb and anion gap closed x2. Morning lab bicarb greater than 15 and anion gap is closed we will wait for the next repeat BMP to bridge pt. patient denies any shortness of breath, chest pain, abdominal pain, nausea and vomiting. Hemoglobin A1c and C-peptide pending this morning. Brief History:     See H&P    Review of Systems:     Review of Systems   Constitutional: Negative for activity change, appetite change, chills and fever. HENT: Negative for trouble swallowing. Respiratory: Negative for cough, shortness of breath and wheezing. Cardiovascular: Negative for chest pain, palpitations and leg swelling. Gastrointestinal: Negative for abdominal pain, constipation, diarrhea, nausea and vomiting. Genitourinary: Negative for difficulty urinating. Musculoskeletal: Negative for arthralgias. Neurological: Negative for headaches. Medications:      Allergies:  No Known Allergies    Current Meds:   Scheduled Meds:    fenofibrate  54 mg Oral Daily    lisinopril  5 mg Oral Daily    sodium chloride  15 mL/kg Intravenous Once    enoxaparin  30 mg Subcutaneous BID     Continuous Infusions: and orders as documented by the resident.   Advance diet okay to out of ICU transition to long-acting insulin if gap remains closed and tolerates a diabetic diet okay to discharge later today    Electronically signed by Debi Danielle MD

## 2020-05-11 LAB — CCP IGG ANTIBODIES: <1.5 U/ML

## 2020-05-14 RX ORDER — PEN NEEDLE, DIABETIC 31 GX5/16"
1 NEEDLE, DISPOSABLE MISCELLANEOUS 2 TIMES DAILY
Qty: 100 EACH | Refills: 0 | Status: SHIPPED | OUTPATIENT
Start: 2020-05-14 | End: 2020-09-30

## 2020-05-14 NOTE — TELEPHONE ENCOUNTER
Patient was only seen in the hospital and he was prescribed insulin pens. .but no needles. Please approve the needles.

## 2020-06-25 ENCOUNTER — OFFICE VISIT (OUTPATIENT)
Dept: INTERNAL MEDICINE CLINIC | Age: 35
End: 2020-06-25
Payer: COMMERCIAL

## 2020-06-25 VITALS
HEART RATE: 79 BPM | OXYGEN SATURATION: 98 % | SYSTOLIC BLOOD PRESSURE: 124 MMHG | DIASTOLIC BLOOD PRESSURE: 76 MMHG | HEIGHT: 67 IN | WEIGHT: 252 LBS | TEMPERATURE: 97.7 F | BODY MASS INDEX: 39.55 KG/M2

## 2020-06-25 PROBLEM — E78.2 MIXED HYPERLIPIDEMIA: Status: ACTIVE | Noted: 2020-06-25

## 2020-06-25 PROBLEM — E10.10 DKA, TYPE 1, NOT AT GOAL (HCC): Status: RESOLVED | Noted: 2020-05-08 | Resolved: 2020-06-25

## 2020-06-25 PROCEDURE — 99214 OFFICE O/P EST MOD 30 MIN: CPT | Performed by: INTERNAL MEDICINE

## 2020-06-25 PROCEDURE — 1036F TOBACCO NON-USER: CPT | Performed by: INTERNAL MEDICINE

## 2020-06-25 PROCEDURE — 2022F DILAT RTA XM EVC RTNOPTHY: CPT | Performed by: INTERNAL MEDICINE

## 2020-06-25 PROCEDURE — 3046F HEMOGLOBIN A1C LEVEL >9.0%: CPT | Performed by: INTERNAL MEDICINE

## 2020-06-25 PROCEDURE — G8417 CALC BMI ABV UP PARAM F/U: HCPCS | Performed by: INTERNAL MEDICINE

## 2020-06-25 PROCEDURE — G8427 DOCREV CUR MEDS BY ELIG CLIN: HCPCS | Performed by: INTERNAL MEDICINE

## 2020-06-25 ASSESSMENT — ENCOUNTER SYMPTOMS
DIARRHEA: 0
BLOOD IN STOOL: 0
EYE PAIN: 0
COUGH: 0
TROUBLE SWALLOWING: 0
EYE DISCHARGE: 0
WHEEZING: 0
ABDOMINAL DISTENTION: 0
SHORTNESS OF BREATH: 0
COLOR CHANGE: 0

## 2020-06-25 ASSESSMENT — PATIENT HEALTH QUESTIONNAIRE - PHQ9
2. FEELING DOWN, DEPRESSED OR HOPELESS: 0
SUM OF ALL RESPONSES TO PHQ QUESTIONS 1-9: 0
SUM OF ALL RESPONSES TO PHQ9 QUESTIONS 1 & 2: 0
SUM OF ALL RESPONSES TO PHQ QUESTIONS 1-9: 0
1. LITTLE INTEREST OR PLEASURE IN DOING THINGS: 0

## 2020-06-25 NOTE — PROGRESS NOTES
Post-Discharge Transitional Care Management Services or Hospital Follow Up      Chandana Lynn   YOB: 1985    Date of Office Visit:  6/25/2020  Date of Hospital Admission: 5/8/20  Date of Hospital Discharge: 5/10/20  Readmission Risk Score(high >=14%. Medium >=10%):Readmission Risk Score: 9      Care management risk score Rising risk (score 2-5) and Complex Care (Scores >=6): 1     Non face to face  following discharge, date last encounter closed (first attempt may have been earlier): *No documented post hospital discharge outreach found in the last 14 days *No documented post hospital discharge outreach found in the last 14 days    Call initiated 2 business days of discharge: *No response recorded in the last 14 days     Patient Active Problem List   Diagnosis    Morbid obesity ([facility]    Essential hypertension    DM type 2, uncontrolled, with neuropathy ([facility]    Mixed hyperlipidemia       No Known Allergies    Medications listed as ordered at the time of discharge from hospital   [facility]:    Printed on:06/25/20 4802   Medication Information                      blood glucose monitor strips  Test 1 times a day & as needed for symptoms of irregular blood glucose.              fenofibrate (TRICOR) 54 MG tablet  Take 1 tablet by mouth daily s [facility]: Please dispense generic fenofibrate unless prescriber denote             glucose monitoring kit (FREESTYLE) monitoring kit  1 kit by Does not apply route daily             insulin glargine (LANTUS SOLOSTAR) 100 UNIT/ML injection pen  Inject 10 Units into the skin nightly             Insulin Pen Needle (PEN NEEDLES 31GX5/16\") 31G X 8 MM MISC  1 each by Does not apply route 2 times daily             lisinopril (PRINIVIL;ZESTRIL) 5 MG tablet  Take 1 tablet by mouth daily             metFORMIN (GLUCOPHAGE) 1000 MG tablet  Take 1 tablet by mouth 2 times daily (with meals)                   Medications cough, shortness of breath and wheezing. Cardiovascular: Negative for chest pain and palpitations. Gastrointestinal: Negative for abdominal distention, blood in stool and diarrhea. Endocrine: Negative for polydipsia and polyphagia. Genitourinary: Negative for difficulty urinating and frequency. Musculoskeletal: Negative for gait problem, myalgias and neck pain. Skin: Negative for color change and rash. Allergic/Immunologic: Negative for environmental allergies and food allergies. Neurological: Negative for dizziness and headaches. Hematological: Negative for adenopathy. Does not bruise/bleed easily. Psychiatric/Behavioral: Negative for behavioral problems and sleep disturbance. Vitals:    06/25/20 1301   BP: 124/76   Pulse: 79   Temp: 97.7 °F (36.5 °C)   SpO2: 98%   Weight: 252 lb (114.3 kg)   Height: 5' 7.01\" (1.702 m)     Body mass index is 39.46 kg/m². Wt Readings from Last 3 Encounters:   06/25/20 252 lb (114.3 kg)   05/10/20 264 lb 1.8 oz (119.8 kg)     BP Readings from Last 3 Encounters:   06/25/20 124/76   05/10/20 (!) 148/92       Physical Exam  Constitutional:       Appearance: He is well-developed. He is not diaphoretic. Comments: Morbid obese   HENT:      Head: Normocephalic and atraumatic. Eyes:      General:         Right eye: No discharge. Left eye: No discharge. Extraocular Movements:      Right eye: Normal extraocular motion. Left eye: Normal extraocular motion. Conjunctiva/sclera: Conjunctivae normal.      Right eye: Right conjunctiva is not injected. Left eye: Left conjunctiva is not injected. Neck:      Musculoskeletal: Normal range of motion and neck supple. No edema or erythema. Thyroid: No thyroid mass or thyromegaly. Vascular: No JVD. Cardiovascular:      Rate and Rhythm: Normal rate and regular rhythm. Heart sounds: No murmur. No friction rub.    Pulmonary:      Effort: Pulmonary effort is normal. No tachypnea,

## 2020-08-06 ENCOUNTER — HOSPITAL ENCOUNTER (EMERGENCY)
Age: 35
Discharge: HOME | End: 2020-08-06
Attending: EMERGENCY MEDICINE
Payer: COMMERCIAL

## 2020-08-06 VITALS
OXYGEN SATURATION: 98 % | RESPIRATION RATE: 16 BRPM | WEIGHT: 260 LBS | HEIGHT: 67 IN | SYSTOLIC BLOOD PRESSURE: 167 MMHG | HEART RATE: 87 BPM | BODY MASS INDEX: 40.81 KG/M2 | DIASTOLIC BLOOD PRESSURE: 110 MMHG | TEMPERATURE: 98.6 F

## 2020-08-06 PROCEDURE — 99283 EMERGENCY DEPT VISIT LOW MDM: CPT

## 2020-08-06 PROCEDURE — 12013 RPR F/E/E/N/L/M 2.6-5.0 CM: CPT

## 2020-08-06 RX ORDER — CHLORHEXIDINE GLUCONATE 0.12 MG/ML
15 RINSE ORAL 2 TIMES DAILY
Qty: 420 ML | Refills: 0 | Status: SHIPPED | OUTPATIENT
Start: 2020-08-06 | End: 2020-08-20

## 2020-08-06 RX ORDER — LIDOCAINE HYDROCHLORIDE AND EPINEPHRINE 10; 10 MG/ML; UG/ML
20 INJECTION, SOLUTION INFILTRATION; PERINEURAL ONCE
Status: DISCONTINUED | OUTPATIENT
Start: 2020-08-06 | End: 2020-08-06

## 2020-08-06 ASSESSMENT — PAIN SCALES - GENERAL: PAINLEVEL_OUTOF10: 6

## 2020-08-06 ASSESSMENT — ENCOUNTER SYMPTOMS
ABDOMINAL PAIN: 0
VOMITING: 0
CONSTIPATION: 0
CHEST TIGHTNESS: 0
COUGH: 0
DIARRHEA: 0
SHORTNESS OF BREATH: 0
NAUSEA: 0
BLOOD IN STOOL: 0
SORE THROAT: 0

## 2020-08-06 ASSESSMENT — PAIN DESCRIPTION - PAIN TYPE: TYPE: ACUTE PAIN

## 2020-08-06 ASSESSMENT — PAIN DESCRIPTION - FREQUENCY: FREQUENCY: CONTINUOUS

## 2020-08-06 ASSESSMENT — PAIN DESCRIPTION - DESCRIPTORS: DESCRIPTORS: ACHING

## 2020-08-06 ASSESSMENT — PAIN DESCRIPTION - LOCATION: LOCATION: MOUTH

## 2020-08-06 ASSESSMENT — PAIN DESCRIPTION - ORIENTATION: ORIENTATION: UPPER
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
## 2020-08-07 NOTE — ED PROVIDER NOTES
Sexual Activity    Alcohol use: Yes     Comment: socially    Drug use: Yes     Types: Marijuana    Sexual activity: Not on file   Lifestyle    Physical activity     Days per week: Not on file     Minutes per session: Not on file    Stress: Not on file   Relationships    Social connections     Talks on phone: Not on file     Gets together: Not on file     Attends Restorationism service: Not on file     Active member of club or organization: Not on file     Attends meetings of clubs or organizations: Not on file     Relationship status: Not on file    Intimate partner violence     Fear of current or ex partner: Not on file     Emotionally abused: Not on file     Physically abused: Not on file     Forced sexual activity: Not on file   Other Topics Concern    Not on file   Social History Narrative    Not on file       History reviewed. No pertinent family history. Allergies:  Patient has no known allergies. Home Medications:  Prior to Admission medications    Medication Sig Start Date End Date Taking?  Authorizing Provider   chlorhexidine (PERIDEX) 0.12 % solution Take 15 mLs by mouth 2 times daily for 14 days 8/6/20 8/20/20 Yes Nabila Franco MD   metFORMIN (GLUCOPHAGE) 1000 MG tablet Take 1 tablet by mouth 2 times daily (with meals) 6/25/20   Christophe Kauffman MD   Insulin Pen Needle (PEN NEEDLES 31GX5/16\") 31G X 8 MM MISC 1 each by Does not apply route 2 times daily 5/14/20   Christophe Kauffman MD   fenofibrate (TRICOR) 54 MG tablet Take 1 tablet by mouth daily s [facility]: Please dispense generic fenofibrate unless prescriber denote 5/11/20   Edmundo Grace MD   lisinopril (PRINIVIL;ZESTRIL) 5 MG tablet Take 1 tablet by mouth daily 5/11/20   Edmundo Grace MD   insulin glargine (LANTUS SOLOSTAR) 100 UNIT/ML injection pen Inject 10 Units into the skin nightly 5/10/20   Edmundo Grace MD   glucose monitoring kit (FREESTYLE) monitoring kit 1 kit by Does not apply route daily 5/10/20   Edmundo Grace MD   blood glucose monitor strips Test 1 times a day & as needed for symptoms of irregular blood glucose. 5/10/20   Terence Graham MD       REVIEW OF SYSTEMS    (2-9 systems for level 4, 10 or more for level 5)      Review of Systems   Constitutional: Negative for chills and fever. HENT: Negative for sore throat and tinnitus. Eyes: Negative for visual disturbance. Respiratory: Negative for cough, chest tightness and shortness of breath. Cardiovascular: Negative for chest pain and palpitations. Gastrointestinal: Negative for abdominal pain, blood in stool, constipation, diarrhea, nausea and vomiting. Genitourinary: Negative for dysuria and hematuria. Neurological: Positive for dizziness (Resolved). Negative for light-headedness and headaches. PHYSICAL EXAM   (up to 7 for level 4, 8 or more for level 5)      INITIAL VITALS:   BP (!) 167/110   Pulse 87   Temp 98.6 °F (37 °C) (Oral)   Resp 16   Ht 5' 7\" (1.702 m)   Wt 260 lb (117.9 kg)   SpO2 98%   BMI 40.72 kg/m²     Physical Exam  Constitutional:       Appearance: Normal appearance. HENT:      Head: Normocephalic. Mouth/Throat:      Mouth: Mucous membranes are moist.   Eyes:      Extraocular Movements: Extraocular movements intact. Pupils: Pupils are equal, round, and reactive to light. Comments: 3 cm laceration on right eyebrow   Neck:      Musculoskeletal: Normal range of motion and neck supple. Cardiovascular:      Rate and Rhythm: Normal rate and regular rhythm. Pulses: Normal pulses. Heart sounds: Normal heart sounds. Pulmonary:      Effort: Pulmonary effort is normal.      Breath sounds: Normal breath sounds. Abdominal:      Palpations: Abdomen is soft. Tenderness: There is no abdominal tenderness. Skin:     General: Skin is warm. Capillary Refill: Capillary refill takes less than 2 seconds. Neurological:      General: No focal deficit present.       Mental Status: He is alert and
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
## 2020-08-19 ENCOUNTER — TELEPHONE (OUTPATIENT)
Dept: INTERNAL MEDICINE CLINIC | Age: 35
End: 2020-08-19

## 2020-09-14 ENCOUNTER — TELEPHONE (OUTPATIENT)
Dept: INTERNAL MEDICINE CLINIC | Age: 35
End: 2020-09-14

## 2020-09-30 RX ORDER — INSULIN GLARGINE 100 [IU]/ML
10 INJECTION, SOLUTION SUBCUTANEOUS NIGHTLY
Qty: 5 PEN | Refills: 0 | Status: SHIPPED | OUTPATIENT
Start: 2020-09-30 | End: 2020-10-07 | Stop reason: SDUPTHER

## 2020-09-30 RX ORDER — PEN NEEDLE, DIABETIC 31 GX5/16"
NEEDLE, DISPOSABLE MISCELLANEOUS
Qty: 100 EACH | Refills: 0 | Status: SHIPPED | OUTPATIENT
Start: 2020-09-30 | End: 2020-12-01

## 2020-10-05 RX ORDER — FENOFIBRATE 54 MG/1
TABLET ORAL
Qty: 30 TABLET | Refills: 2 | Status: SHIPPED | OUTPATIENT
Start: 2020-10-05 | End: 2021-09-01 | Stop reason: SDUPTHER

## 2020-10-05 RX ORDER — LISINOPRIL 5 MG/1
TABLET ORAL
Qty: 30 TABLET | Refills: 2 | Status: SHIPPED | OUTPATIENT
Start: 2020-10-05 | End: 2021-09-01 | Stop reason: SDUPTHER

## 2020-10-05 NOTE — TELEPHONE ENCOUNTER
Last visit:   Last Med refill:   Does patient have enough medication for 72 hours:     Next Visit Date:  Future Appointments   Date Time Provider Eric Libertad   11/4/2020  1:00 PM Ruthann Kahn MD [address] Maintenance   Topic Date Due    Varicella vaccine (1 of 2 - 2-dose childhood series) 11/21/1985    Pneumococcal 0-64 years Vaccine (1 of 1 - PPSV23) 11/21/1990    Diabetic retinal exam  11/21/1994    HIV screen  11/21/1999    Diabetic microalbuminuria test  11/21/2002    Hepatitis B vaccine (1 of 3 - Risk 3-dose series) 11/21/2003    DTaP/Tdap/Td vaccine (1 - Tdap) 11/21/2003    A1C test (Diabetic or Prediabetic)  08/09/2020    Flu vaccine (1) 09/01/2020    Lipid screen  05/09/2021    Potassium monitoring  05/10/2021    Creatinine monitoring  05/10/2021    Diabetic foot exam  06/25/2021    Hepatitis A vaccine  Aged Out    Hib vaccine  Aged Out    Meningococcal (ACWY) vaccine  Aged Out       Hemoglobin A1C (%)   Date Value   05/09/2020 10.6 (H)   05/08/2020 11.9 (H)             ( goal A1C is < 7)   No results found for: LABMICR  LDL Cholesterol (mg/dL)   Date Value   05/09/2020            (goal LDL is <100)   AST (U/L)   Date Value   05/08/2020 NOT REPORTED     ALT (U/L)   Date Value   05/08/2020 NOT REPORTED     BUN (mg/dL)   Date Value   05/10/2020 3 (L)     BP Readings from Last 3 Encounters:   08/06/20 (!) 167/110   06/25/20 124/76   05/10/20 (!) 148/92          (goal 120/80)    All Future Testing planned in [facility]  Lab Frequency Next Occurrence   Microalbumin, Ur Once 11/30/2020               Patient Active Problem List:     Morbid obesity ([address]     Essential hypertension     DM type 2, uncontrolled, with neuropathy ([address]     Mixed hyperlipidemia

## 2020-10-07 RX ORDER — INSULIN GLARGINE 100 [IU]/ML
10 INJECTION, SOLUTION SUBCUTANEOUS NIGHTLY
Qty: 5 PEN | Refills: 0 | Status: SHIPPED | OUTPATIENT
Start: 2020-10-07 | End: 2021-10-05

## 2020-10-07 NOTE — TELEPHONE ENCOUNTER
Medication:Insulin Glargine( Lantus Solostar)  Last visit: 06/25/2020  Next visit: 11/4/2020  Last refill: 9/30/2020  Pharmacy: [facility] [id number]

## 2020-12-01 RX ORDER — PEN NEEDLE, DIABETIC 31 GX5/16"
NEEDLE, DISPOSABLE MISCELLANEOUS
Qty: 100 EACH | Refills: 0 | Status: SHIPPED | OUTPATIENT
Start: 2020-12-01

## 2021-07-21 ENCOUNTER — TELEPHONE (OUTPATIENT)
Dept: INTERNAL MEDICINE CLINIC | Age: 36
End: 2021-07-21

## 2021-07-21 NOTE — TELEPHONE ENCOUNTER
----- Message from Jj Rodrigues sent at 7/21/2021  8:19 AM EDT -----  Subject: Message to Provider    QUESTIONS  Information for Provider? PT called to schedule an appointment - due to   frequency 1 week - also f/u to DM - please advise  ---------------------------------------------------------------------------  --------------  CALL BACK INFO  What is the best way for the office to contact you? OK to leave message on   voicemail  Preferred Call Back Phone Number? [phone]  ---------------------------------------------------------------------------  --------------  SCRIPT ANSWERS  Relationship to Patient? Other  Representative Name? Nashe  Additional information verified (besides Name and Date of Birth)? Address  Appointment reason? Symptomatic  Select script based on patient symptoms? Adult Urinary Symptoms   [Urine-related, UTI, Bladder Infection]  Are you having severe back pain with your urinary symptoms? No  Are you having vomiting or nausea? No  Is there blood in your urine? No  Are you having fevers (100.4), chills, or sweats? No  Have you been seen by a provider for this symptom before?  Yes

## 2021-07-22 ENCOUNTER — TELEPHONE (OUTPATIENT)
Dept: INTERNAL MEDICINE CLINIC | Age: 36
End: 2021-07-22

## 2021-07-22 NOTE — LETTER
[address] [address]          July 22nd, 2021     Dear Edmund Santos:     You recently missed a scheduled appointment with Nader Allen CNP on 7/22/21. This is the 3rd scheduledappointment that you have missed within the last twelve months. If you miss 0 more appointment, you may be dismissed from the practice. It is your responsibility to arrive to your appointment. Please call our office as soon as possible so that we may reschedule your appointment, because your health and follow-up medical care are important to us. For future appointments that you are unable to keep, please call the office at [phone] at least 24 hours in advance to cancel and reschedule. If a traditional appointment is difficult for you to make, please keep in mind, we offer E-Visits for several diagnoses as well as virtual visits. We also have primary care walk-in clinics available. For more information on these options, please contact our office.    Sincerely,        [address] [place]

## 2021-07-22 NOTE — TELEPHONE ENCOUNTER
LM on pts phone regarding his no show appt kenji Browning CNP on 7/22/21. No answer, unable to lm, voice mail not set up yet. Ltr mailed.

## 2021-08-02 ENCOUNTER — HOSPITAL ENCOUNTER (EMERGENCY)
Age: 36
Discharge: HOME | End: 2021-08-03
Attending: EMERGENCY MEDICINE
Payer: COMMERCIAL

## 2021-08-02 DIAGNOSIS — L03.011 PARONYCHIA OF FINGER OF RIGHT HAND: ICD-10-CM

## 2021-08-02 DIAGNOSIS — L03.012 PARONYCHIA OF FINGER OF LEFT HAND: Primary | ICD-10-CM

## 2021-08-02 PROCEDURE — 99284 EMERGENCY DEPT VISIT MOD MDM: CPT

## 2021-08-03 VITALS
OXYGEN SATURATION: 99 % | DIASTOLIC BLOOD PRESSURE: 100 MMHG | BODY MASS INDEX: 40.81 KG/M2 | HEIGHT: 67 IN | TEMPERATURE: 98.7 F | WEIGHT: 260 LBS | RESPIRATION RATE: 16 BRPM | SYSTOLIC BLOOD PRESSURE: 170 MMHG | HEART RATE: 104 BPM

## 2021-08-03 RX ORDER — CEPHALEXIN 500 MG/1
500 CAPSULE ORAL 4 TIMES DAILY
Qty: 28 CAPSULE | Refills: 0 | Status: SHIPPED | OUTPATIENT
Start: 2021-08-03 | End: 2021-08-10

## 2021-08-03 ASSESSMENT — ENCOUNTER SYMPTOMS
NAUSEA: 0
VOMITING: 0

## 2021-08-03 NOTE — ED PROVIDER NOTES
[address]  Emergency Department Encounter  EmergencyMedicine Resident     Pt Name:Rishi Maria  MRN: [id number]  [place] 1985  Date of evaluation: 8/3/21  PCP:  Everton Forman MD    [address] [place] [address]       Chief Complaint   Patient presents with    Skin Problem       HISTORY OF PRESENT ILLNESS  (Location/Symptom, Timing/Onset, Context/Setting, Quality, Duration, Modifying Factors, Severity.)      Kaci Palomo is a 38 y.o. male who presents with 3 days of bilateral first digit paronychia. Patient states he he was cutting grass admission to clean the blade, noted that the day later started getting red and inflamed. Patient has not anything to treat it, no warm soaks. Patient has a history of diabetes. Denies any other skin infections, denies history of MRSA. Patient reports no nausea, vomiting, fevers or chills. PAST MEDICAL / SURGICAL / SOCIAL / FAMILY HISTORY      has a past medical history of Diabetes ([address] No additional pertinent     has a past surgical history that includes knee surgery and Abdomen surgery.   Additional pertinent    Social History     Socioeconomic History    Marital status: Single     Spouse name: Not on file    Number of children: Not on file    Years of education: Not on file    Highest education level: Not on file   Occupational History    Not on file   Tobacco Use    Smoking status: Former Smoker     Types: Cigarettes     Quit date: 2020     Years since quittin.2    Smokeless tobacco: Never Used   Substance and Sexual Activity    Alcohol use: Yes     Comment: socially    Drug use: Yes     Types: Marijuana    Sexual activity: Not on file   Other Topics Concern    Not on file   Social History Narrative    Not on file     Social Determinants of Health     Financial Resource Strain:     Difficulty of Paying Living Expenses:    Food Insecurity:     Worried About Running Out of Food in the Last Year:     [address] in the Last Year:    Transportation Needs:     Lack of Transportation (Medical):  Lack of Transportation (Non-Medical):    Physical Activity:     Days of Exercise per Week:     Minutes of Exercise per Session:    Stress:     Feeling of Stress :    Social Connections:     Frequency of Communication with Friends and Family:     Frequency of Social Gatherings with Friends and Family:     Attends Sabianism Services:     Active Member of Clubs or Organizations:     Attends Club or Organization Meetings:     Marital Status:    Intimate Partner Violence:     Fear of Current or Ex-Partner:     Emotionally Abused:     Physically Abused:     Sexually Abused:        History reviewed. No pertinent family history. Allergies:  Patient has no known allergies. Home Medications:  Prior to Admission medications    Medication Sig Start Date End Date Taking? Authorizing Provider   cephALEXin (KEFLEX) 500 MG capsule Take 1 capsule by mouth 4 times daily for 7 days 8/3/21 8/10/21 Yes Monster Shepherd DO   TRUEPLUS PEN NEEDLES 31G X 8 MM MISC USE NIGHTLY WITH LANTUS 12/1/20   Tiago Fajardo MD   insulin glargine (LANTUS SOLOSTAR) 100 UNIT/ML injection pen Inject 10 Units into the skin nightly 10/7/20   Tiago Fajardo MD   lisinopril (PRINIVIL;ZESTRIL) 5 MG tablet TAKE ONE TABLET BY MOUTH DAILY 10/5/20   Tiago Fajardo MD   fenofibrate (TRICOR) 54 MG tablet TAKE ONE TABLET BY MOUTH DAILY 10/5/20   Tiago Fajardo MD   metFORMIN (GLUCOPHAGE) 1000 MG tablet Take 1 tablet by mouth 2 times daily (with meals) 6/25/20   Tiago Fajardo MD   glucose monitoring kit (FREESTYLE) monitoring kit 1 kit by Does not apply route daily 5/10/20   Kelsea Chapman MD   blood glucose monitor strips Test 1 times a day & as needed for symptoms of irregular blood glucose.  5/10/20   Kelsea Chapman MD       REVIEW OF SYSTEMS    (2-9 systems for level 4, 10 or more for level 5)      Review of Systems   Constitutional: Negative for chills and fever. Gastrointestinal: Negative for nausea and vomiting. Skin: Positive for wound (Bilateral first digit paronychia, mild erythema and swelling present). Neurological: Negative for numbness. PHYSICAL EXAM   (up to 7 for level 4, 8 or more for level 5)      INITIAL VITALS:   BP (!) 170/100   Pulse 104   Temp 98.7 °F (37.1 °C) (Oral)   Resp 16   Ht 5' 7\" (1.702 m)   Wt 260 lb (117.9 kg)   SpO2 99%   BMI 40.72 kg/m²     Physical Exam  Constitutional:       Appearance: Normal appearance. HENT:      Head: Normocephalic. Mouth/Throat:      Mouth: Mucous membranes are moist.      Pharynx: Oropharynx is clear. Pulmonary:      Effort: Pulmonary effort is normal.   Musculoskeletal:         General: Swelling (Swelling to bilateral index fingers) and tenderness present. Skin:     General: Skin is warm and dry. Findings: Erythema (Erythema and swelling on bilateral index fingers resembling paronychia.) present. Neurological:      General: No focal deficit present. Mental Status: He is alert and oriented to person, place, and time. DIFFERENTIAL  DIAGNOSIS     PLAN (LABS / IMAGING / EKG):  No orders of the defined types were placed in this encounter. MEDICATIONS ORDERED:  Orders Placed This Encounter   Medications    DISCONTD: butamben-tetracaine-benzocaine (CETACAINE) spray 1 spray    cephALEXin (KEFLEX) 500 MG capsule     Sig: Take 1 capsule by mouth 4 times daily for 7 days     Dispense:  28 capsule     Refill:  0       DIAGNOSTIC RESULTS / EMERGENCY DEPARTMENT COURSE / MDM   LAB RESULTS:  No results found for this visit on 08/02/21. RADIOLOGY:  No orders to display        PROCEDURES:  None    CONSULTS:  None    MEDICAL DECISION MAKING:  Patient presenting with bilateral paronychia with no systemic signs of infection. Plan for incision and drainage. Patient received incision and drainage with purulent matter excised bilaterally. Wound was wrapped. Patient was noted to be hypertensive, conversation had with patient about his hypertension. Patient states that this is frequently when he comes to hospitals, feels that its whitecoat nature. Discussed importance of blood pressure medication management, patient was in agreement. Patient was discharged home in good stable condition with dressings over his incision and drainage sites. Patient was provided antibiotic as he is diabetic and had bilateral infections    CRITICAL CARE:  Please see attending note    FINAL IMPRESSION      1. Paronychia of finger of left hand    2.  Paronychia of finger of right hand          DISPOSITION / PLAN     DISPOSITION Decision To Discharge 08/03/2021 12:57:32 AM      PATIENT REFERRED TO:  Sarina Hernandez MD  [address]  [phone]    Schedule an appointment as soon as possible for a visit         DISCHARGE MEDICATIONS:  Discharge Medication List as of 8/3/2021  1:08 AM      START taking these medications    Details   cephALEXin (KEFLEX) 500 MG capsule Take 1 capsule by mouth 4 times daily for 7 days, Disp-28 capsule, R-0Print             Cora Cotto DO  Emergency Medicine Resident    (Please note that portions of thisnote were completed with a voice recognition program.  Efforts were made to edit the dictations but occasionally words are mis-transcribed.)       Dillon Long DO  Resident  08/03/21 5914

## 2021-08-03 NOTE — ED TRIAGE NOTES
Mode of arrival (squad #, walk in, police, etc) : walk in        Chief complaint(s): Finger swelling        Arrival Note (brief scenario, treatment PTA, etc). : Pt states he has had swelling to both nails beds on his bilateral second fingers after using a wire brush. C= \"Have you ever felt that you should Cut down on your drinking? \"  No  A= \"Have people Annoyed you by criticizing your drinking? \"  No  G= \"Have you ever felt bad or Guilty about your drinking? \"  No  E= \"Have you ever had a drink as an Eye-opener first thing in the morning to steady your nerves or to help a hangover? \"  No      Deferred []      Reason for deferring: N/A    *If yes to two or more: probable alcohol abuse. *

## 2021-08-03 NOTE — ED PROVIDER NOTES
EMERGENCY DEPARTMENT ENCOUNTER   ATTENDING ATTESTATION     Pt Name: Ricky Gonzalez  MRN: [id number]  [place] 1985  Date of evaluation: 8/3/21       Ricky Gonzalez is a 38 y.o. male who presents with Skin Problem      MDM:   This is a 28-year-old male who comes in with bilateral pointer finger pain after cleaning out a lawn more with his bare hands and then using a wire brush to clean his fingernails appears to have paronychia bilaterally no evidence of flexor tenosynovitis or cellulitis we will treat with I&D and Keflex    Vitals:   Vitals:    08/02/21 2148   BP: (!) 190/106   Pulse: 105   Resp: 16   Temp: 99.3 °F (37.4 °C)   TempSrc: Oral   SpO2: 100%   Weight: 260 lb (117.9 kg)   Height: 5' 7\" (1.702 m)         I personally evaluated and examined the patient in conjunction with the resident and agree with the assessment, treatment plan, and disposition of the patient as recorded by the resident. I performed a history and physical examination of the patient and discussed management with the resident. I reviewed the residents note and agree with the documented findings and plan of care. Any areas of disagreement are noted on the chart. I was personally present for the key portions of any procedures. I have documented in the chart those procedures where I was not present during the key portions. I have personally reviewed all images and agree with the resident's interpretation. I have reviewed the emergency nurses triage note. I agree with the chief complaint, past medical history, past surgical history, allergies, medications, social and family history as documented unless otherwise noted.     Jania Gayle MD  Attending Emergency Physician            Jania Gayle MD  08/03/21 5480

## 2021-09-01 RX ORDER — LISINOPRIL 5 MG/1
5 TABLET ORAL DAILY
Qty: 30 TABLET | Refills: 2 | Status: SHIPPED | OUTPATIENT
Start: 2021-09-01

## 2021-09-01 RX ORDER — FENOFIBRATE 54 MG/1
54 TABLET ORAL DAILY
Qty: 30 TABLET | Refills: 2 | Status: SHIPPED | OUTPATIENT
Start: 2021-09-01

## 2021-09-01 NOTE — TELEPHONE ENCOUNTER
Last visit: 6/25/20    Last Med refill: 10/5/20  Does patient have enough medication for 72 hours:     Next Visit Date:  Future Appointments   Date Time Provider Eric Maurer   9/28/2021  2:45 PM Darwin Cobb MD [address] Maintenance   Topic Date Due    Hepatitis C screen  Never done    Varicella vaccine (1 of 2 - 2-dose childhood series) Never done    Pneumococcal 0-64 years Vaccine (1 of 2 - PPSV23) Never done    Diabetic retinal exam  Never done    COVID-19 Vaccine (1) Never done    HIV screen  Never done    Diabetic microalbuminuria test  Never done    Hepatitis B vaccine (1 of 3 - Risk 3-dose series) Never done    DTaP/Tdap/Td vaccine (1 - Tdap) Never done    A1C test (Diabetic or Prediabetic)  05/09/2021    Lipid screen  05/09/2021    Potassium monitoring  05/10/2021    Creatinine monitoring  05/10/2021    Diabetic foot exam  06/25/2021    Flu vaccine (1) Never done    Hepatitis A vaccine  Aged Out    Hib vaccine  Aged Out    Meningococcal (ACWY) vaccine  Aged Out       Hemoglobin A1C (%)   Date Value   05/09/2020 10.6 (H)   05/08/2020 11.9 (H)             ( goal A1C is < 7)   No results found for: LABMICR  LDL Cholesterol (mg/dL)   Date Value   05/09/2020            (goal LDL is <100)   AST (U/L)   Date Value   05/08/2020 NOT REPORTED     ALT (U/L)   Date Value   05/08/2020 NOT REPORTED     BUN (mg/dL)   Date Value   05/10/2020 3 (L)     BP Readings from Last 3 Encounters:   08/03/21 (!) 170/100   08/06/20 (!) 167/110   06/25/20 124/76          (goal 120/80)    All Future Testing planned in [facility]              Patient Active Problem List:     Morbid obesity ([address]     Essential hypertension     DM type 2, uncontrolled, with neuropathy ([facility]     Mixed hyperlipidemia

## 2021-09-28 ENCOUNTER — TELEPHONE (OUTPATIENT)
Dept: INTERNAL MEDICINE CLINIC | Age: 36
End: 2021-09-28

## 2021-09-28 NOTE — LETTER
Mitchell Weiss         [facility] [address] [id number]          September 28th, 2021     Dear Mitchell Weiss:     You recently missed a scheduled appointment with Dr. Annamaria Gomez on 9/28/21. This is the 2nd scheduledappointment that you have missed within the last twelve months. If you miss 1 more appointment, you may be dismissed from the practice. It is your responsibility to arrive to your appointment. Please call our office as soon as possible so that we may reschedule your appointment, because your health and follow-up medical care are important to us. For future appointments that you are unable to keep, please call the office at [phone] at least 24 hours in advance to cancel and reschedule. If a traditional appointment is difficult for you to make, please keep in mind, we offer E-Visits for several diagnoses as well as virtual visits. We also have primary care walk-in clinics available. For more information on these options, please contact our office.    Sincerely,        [address] [facility]

## 2021-09-28 NOTE — TELEPHONE ENCOUNTER
Writer spoke w/ pt regarding his no show appt kenji w/ Dr. Claudia Padilla on 9/28/21. Pt states he didn't receive a reminder call. Appt lilo & ltr mailed.

## 2021-10-05 ENCOUNTER — OFFICE VISIT (OUTPATIENT)
Dept: INTERNAL MEDICINE CLINIC | Age: 36
End: 2021-10-05
Payer: COMMERCIAL

## 2021-10-05 VITALS
BODY MASS INDEX: 37.57 KG/M2 | HEIGHT: 67 IN | OXYGEN SATURATION: 98 % | DIASTOLIC BLOOD PRESSURE: 88 MMHG | SYSTOLIC BLOOD PRESSURE: 132 MMHG | WEIGHT: 239.4 LBS | HEART RATE: 105 BPM

## 2021-10-05 DIAGNOSIS — Z13.220 SCREENING FOR HYPERLIPIDEMIA: ICD-10-CM

## 2021-10-05 DIAGNOSIS — Z13.29 SCREENING FOR THYROID DISORDER: ICD-10-CM

## 2021-10-05 LAB — HBA1C MFR BLD: 11.1 %

## 2021-10-05 PROCEDURE — 99214 OFFICE O/P EST MOD 30 MIN: CPT | Performed by: NURSE PRACTITIONER

## 2021-10-05 PROCEDURE — 83036 HEMOGLOBIN GLYCOSYLATED A1C: CPT | Performed by: NURSE PRACTITIONER

## 2021-10-05 RX ORDER — INSULIN GLARGINE 100 [IU]/ML
20 INJECTION, SOLUTION SUBCUTANEOUS NIGHTLY
Qty: 5 PEN | Refills: 0 | Status: SHIPPED | OUTPATIENT
Start: 2021-10-05

## 2021-10-05 RX ORDER — GLIMEPIRIDE 2 MG/1
2 TABLET ORAL EVERY MORNING
Qty: 30 TABLET | Refills: 3 | Status: SHIPPED | OUTPATIENT
Start: 2021-10-05

## 2021-10-05 SDOH — ECONOMIC STABILITY: FOOD INSECURITY: WITHIN THE PAST 12 MONTHS, THE FOOD YOU BOUGHT JUST DIDN'T LAST AND YOU DIDN'T HAVE MONEY TO GET MORE.: NEVER TRUE

## 2021-10-05 SDOH — ECONOMIC STABILITY: FOOD INSECURITY: WITHIN THE PAST 12 MONTHS, YOU WORRIED THAT YOUR FOOD WOULD RUN OUT BEFORE YOU GOT MONEY TO BUY MORE.: NEVER TRUE

## 2021-10-05 ASSESSMENT — ENCOUNTER SYMPTOMS
RHINORRHEA: 0
CHEST TIGHTNESS: 0
ABDOMINAL PAIN: 0
CONSTIPATION: 0
VOMITING: 0
DIARRHEA: 0
SHORTNESS OF BREATH: 0
WHEEZING: 0
TROUBLE SWALLOWING: 0
SINUS PAIN: 0
SORE THROAT: 0
NAUSEA: 0
COUGH: 0
BLOOD IN STOOL: 0

## 2021-10-05 ASSESSMENT — PATIENT HEALTH QUESTIONNAIRE - PHQ9
SUM OF ALL RESPONSES TO PHQ QUESTIONS 1-9: 0
SUM OF ALL RESPONSES TO PHQ QUESTIONS 1-9: 0
SUM OF ALL RESPONSES TO PHQ9 QUESTIONS 1 & 2: 0
1. LITTLE INTEREST OR PLEASURE IN DOING THINGS: 0
2. FEELING DOWN, DEPRESSED OR HOPELESS: 0
SUM OF ALL RESPONSES TO PHQ QUESTIONS 1-9: 0

## 2021-10-05 ASSESSMENT — SOCIAL DETERMINANTS OF HEALTH (SDOH): HOW HARD IS IT FOR YOU TO PAY FOR THE VERY BASICS LIKE FOOD, HOUSING, MEDICAL CARE, AND HEATING?: NOT VERY HARD

## 2021-10-05 NOTE — PROGRESS NOTES
Visit Information    Have you changed or started any medications since your last visit including any over-the-counter medicines, vitamins, or herbal medicines? no   Are you having any side effects from any of your medications? -  no  Have you stopped taking any of your medications? Is so, why? -  no    Have you seen any other physician or provider since your last visit? No  Have you had any other diagnostic tests since your last visit? No  Have you been seen in the emergency room and/or had an admission to a hospital since we last saw you? Yes - Records Requested  Have you had your routine dental cleaning in the past 6 months? no    Have you activated your [employer] account? If not, what are your barriers?  No: pending     Patient Care Team:  Gabrielle Perez MD as PCP - General (Internal Medicine)  Gabrielle Perez MD as PCP - [facility]    Medical History Review  Past Medical, Family, and Social History reviewed and does not contribute to the patient presenting condition    Health Maintenance   Topic Date Due    Hepatitis C screen  Never done    Varicella vaccine (1 of 2 - 2-dose childhood series) Never done    Pneumococcal 0-64 years Vaccine (1 of 2 - PPSV23) Never done    Diabetic retinal exam  Never done    COVID-19 Vaccine (1) Never done    HIV screen  Never done    Diabetic microalbuminuria test  Never done    Hepatitis B vaccine (1 of 3 - Risk 3-dose series) Never done    DTaP/Tdap/Td vaccine (1 - Tdap) Never done    Lipid screen  05/09/2021    Potassium monitoring  05/10/2021    Creatinine monitoring  05/10/2021    Flu vaccine (1) Never done    A1C test (Diabetic or Prediabetic)  01/05/2022    Diabetic foot exam  10/05/2022    Hepatitis A vaccine  Aged Out    Hib vaccine  Aged Out    Meningococcal ([address] [facility]) vaccine  Aged Out         [address] [address]  Dept: [phone]  Dept Fax: [phone]    OfficeProgress/Follow Medications   Medication Sig Dispense Refill    insulin glargine (LANTUS SOLOSTAR) 100 UNIT/ML injection pen Inject 20 Units into the skin nightly 5 pen 0    glimepiride (AMARYL) 2 MG tablet Take 1 tablet by mouth every morning 30 tablet 3    metFORMIN (GLUCOPHAGE) 1000 MG tablet Take 1 tablet by mouth 2 times daily (with meals) 180 tablet 1    lisinopril (PRINIVIL;ZESTRIL) 5 MG tablet Take 1 tablet by mouth daily 30 tablet 2    fenofibrate (TRICOR) 54 MG tablet Take 1 tablet by mouth daily s [facility] pharmacy: Please dispense generic fenofibrate unless prescriber denote 30 tablet 2    TRUEPLUS PEN NEEDLES 31G X 8 MM MISC USE NIGHTLY WITH LANTUS 100 each 0    glucose monitoring kit (FREESTYLE) monitoring kit 1 kit by Does not apply route daily 1 kit 0    blood glucose monitor strips Test 1 times a day & as needed for symptoms of irregular blood glucose. 100 strip 1     No current facility-administered medications for this visit. ALLERGIES:    No Known Allergies      SOCIAL HISTORY    Reviewed and no change from previous record. Georgi Riedel  reports that he quit smoking about 17 months ago. His smoking use included cigarettes. He has never used smokeless tobacco.    FAMILY HISTORY:    Reviewed and No change from previous visit    REVIEW OF SYSTEMS:    Review of Systems   Constitutional: Negative for appetite change, chills, diaphoresis, fatigue, fever and unexpected weight change. HENT: Negative for ear pain, postnasal drip, rhinorrhea, sinus pain, sore throat and trouble swallowing. Eyes: Negative for visual disturbance. Respiratory: Negative for cough, chest tightness, shortness of breath and wheezing. Cardiovascular: Negative for chest pain, palpitations and leg swelling. Gastrointestinal: Negative for abdominal pain, blood in stool, constipation, diarrhea, nausea and vomiting. Endocrine: Positive for polydipsia and polyuria. Negative for polyphagia.    Genitourinary: Negative for Judgment normal.          LABORATORY FINDINGS:    CBC:  Lab Results   Component Value Date    WBC 6.6 05/08/2020    HGB 13.6 05/08/2020     05/08/2020       BMP:    Lab Results   Component Value Date     05/10/2020    K 4.1 05/10/2020     05/10/2020    CO2 17 05/10/2020    BUN 3 05/10/2020    CREATININE 0.76 05/10/2020    GLUCOSE 92 05/10/2020       HEMOGLOBIN A1C:   Lab Results   Component Value Date    LABA1C 11.1 10/05/2021       FASTING LIPID PANEL:  Lab Results   Component Value Date    HDL 22 (L) 05/09/2020       ASSESSMENT AND PLAN:      1. DM type 2, uncontrolled, with neuropathy (HCC)  -  DIABETES FOOT EXAM  - Microalbumin, Ur; Future  - POCT glycosylated hemoglobin (Hb A1C)  - Comprehensive Metabolic Panel; Future  - insulin glargine (LANTUS SOLOSTAR) 100 UNIT/ML injection pen; Inject 20 Units into the skin nightly  Dispense: 5 pen; Refill: 0  - glimepiride (AMARYL) 2 MG tablet; Take 1 tablet by mouth every morning  Dispense: 30 tablet; Refill: 3  - CBC With Auto Differential; Future    2. Screening for hyperlipidemia  - Lipid, Fasting; Future    3. Screening for thyroid disorder  - TSH without Reflex; Future      FOLLOW UP AND INSTRUCTIONS:   Return in about 1 week (around 10/12/2021) for DM. Darrius Woods received counseling on the following healthy behaviors: nutrition, exercise and medication adherence    Discussed use, benefit, and side effects of prescribed medications. Barriers to medication compliance addressed. All patient questions answered. Patient voiced understanding. Patient given educational materials - see patient instructions    ARA Finley - CNP   [facility]  10/12/2021, 2:33 PM    Please note that this chart was generated using voice recognition Dragon dictation software. Although everyeffort was made to ensure the accuracy of this automated transcription, some errors in transcription may have occurred.

## 2021-10-25 ENCOUNTER — TELEPHONE (OUTPATIENT)
Dept: INTERNAL MEDICINE CLINIC | Age: 36
End: 2021-10-25

## 2021-10-25 NOTE — TELEPHONE ENCOUNTER
Mailed patient no show appointment letter #3 for the date of 10/25/2021 scheduled with Cindy Baum NP

## 2021-10-25 NOTE — LETTER
140 [facility], [facility]        10/25/2021    Dear David Hancock :     You recently missed a scheduled appointment with Brad Clark NP on 10/25/2021. This is the 3rd scheduledappointment that you have missed within the last twelve months. If you miss  more appointment, you may be dismissed from the practice. It is your responsibility to arrive to your appointment. Please call our office as soon as possible so that we may reschedule your appointment, because your health and follow-up medical care are important to us. For future appointments that you are unable to keep, please call the office at [phone] at least 24 hours in advance to cancel and reschedule. If a traditional appointment is difficult for you to make, please keep in mind, we offer E-Visits for several diagnoses as well as virtual visits. We also have primary care walk-in clinics available. For more information on these options, please contact our office.    Sincerely,        [address]  [address] [place] [address]

## 2021-10-25 NOTE — LETTER
140 [facility], [facility]            10/25/2021    Dear Demimatilda Tellez:     You recently missed a scheduled appointment with Dr Victoria Schaumann on 10/25/2021. This is the 3rd scheduledappointment that you have missed within the last twelve months. If you miss any more appointment, you may be dismissed from the practice. It is your responsibility to arrive to your appointment. Please call our office as soon as possible so that we may reschedule your appointment, because your health and follow-up medical care are important to us. For future appointments that you are unable to keep, please call the office at [phone] at least 24 hours in advance to cancel and reschedule. If a traditional appointment is difficult for you to make, please keep in mind, we offer E-Visits for several diagnoses as well as virtual visits. We also have primary care walk-in clinics available. For more information on these options, please contact our office.    Sincerely,        [address]  [address] [place] [address]

## 2022-01-10 ENCOUNTER — APPOINTMENT (OUTPATIENT)
Dept: GENERAL RADIOLOGY | Age: 37
End: 2022-01-10
Payer: COMMERCIAL

## 2022-01-10 ENCOUNTER — HOSPITAL ENCOUNTER (EMERGENCY)
Age: 37
Discharge: HOME | End: 2022-01-11
Attending: EMERGENCY MEDICINE
Payer: COMMERCIAL

## 2022-01-10 VITALS
OXYGEN SATURATION: 100 % | RESPIRATION RATE: 18 BRPM | TEMPERATURE: 98.1 F | SYSTOLIC BLOOD PRESSURE: 176 MMHG | DIASTOLIC BLOOD PRESSURE: 111 MMHG | HEIGHT: 67 IN | WEIGHT: 240 LBS | BODY MASS INDEX: 37.67 KG/M2 | HEART RATE: 87 BPM

## 2022-01-10 DIAGNOSIS — S92.425B NONDISPLACED FRACTURE OF DISTAL PHALANX OF LEFT GREAT TOE, INITIAL ENCOUNTER FOR OPEN FRACTURE: Primary | ICD-10-CM

## 2022-01-10 PROCEDURE — 99284 EMERGENCY DEPT VISIT MOD MDM: CPT

## 2022-01-10 PROCEDURE — 73630 X-RAY EXAM OF FOOT: CPT

## 2022-01-10 PROCEDURE — 12001 RPR S/N/AX/GEN/TRNK 2.5CM/<: CPT

## 2022-01-10 ASSESSMENT — ENCOUNTER SYMPTOMS
NAUSEA: 0
SHORTNESS OF BREATH: 0
BACK PAIN: 0
ABDOMINAL PAIN: 0
VOMITING: 0

## 2022-01-10 NOTE — Clinical Note
Luiza Schofield was seen and treated in our emergency department on 1/10/2022. He may return to work on 01/13/2022. Please excuse patient from work tomorrow. After that he may return on light duty until seen by podiatry. If you have any questions or concerns, please don't hesitate to call.       Lesly Lundy, DO

## 2022-01-11 PROCEDURE — 6360000002 HC RX W HCPCS: Performed by: STUDENT IN AN ORGANIZED HEALTH CARE EDUCATION/TRAINING PROGRAM

## 2022-01-11 PROCEDURE — 90471 IMMUNIZATION ADMIN: CPT | Performed by: STUDENT IN AN ORGANIZED HEALTH CARE EDUCATION/TRAINING PROGRAM

## 2022-01-11 PROCEDURE — 90715 TDAP VACCINE 7 YRS/> IM: CPT | Performed by: STUDENT IN AN ORGANIZED HEALTH CARE EDUCATION/TRAINING PROGRAM

## 2022-01-11 RX ORDER — CEPHALEXIN 500 MG/1
500 CAPSULE ORAL 2 TIMES DAILY
Qty: 14 CAPSULE | Refills: 0 | Status: SHIPPED | OUTPATIENT
Start: 2022-01-11 | End: 2022-01-18

## 2022-01-11 RX ADMIN — TETANUS TOXOID, REDUCED DIPHTHERIA TOXOID AND ACELLULAR PERTUSSIS VACCINE, ADSORBED 0.5 ML: 5; 2.5; 8; 8; 2.5 SUSPENSION INTRAMUSCULAR at 01:46

## 2022-01-11 NOTE — ED NOTES
Antibiotic ointment to left great toe laceration followed by bert tape and post-op shoe.      Danyell Duncan RN  01/11/22 2135

## 2022-01-11 NOTE — ED NOTES
Mode of arrival (squad #, walk in, police, etc) : Walked into triage        Chief complaint(s): Toe Pain        Arrival Note (brief scenario, treatment PTA, etc). : States he dropped a keg on his Left great toe around 7 pm. A/O X 3        C= \"Have you ever felt that you should Cut down on your drinking? \"  No  A= \"Have people Annoyed you by criticizing your drinking? \"  No  G= \"Have you ever felt bad or Guilty about your drinking? \"  No  E= \"Have you ever had a drink as an Eye-opener first thing in the morning to steady your nerves or to help a hangover? \"  No      Deferred []      Reason for deferring: N/A    *If yes to two or more: probable alcohol abuse. Emeka Rodriguez RN  01/10/22 4980

## 2022-01-11 NOTE — ED PROVIDER NOTES
[address] [facility] ED     Emergency Department     Faculty Attestation        I performed a history and physical examination of the patient and discussed management with the resident. I reviewed the residents note and agree with the documented findings and plan of care. Any areas of disagreement are noted on the chart. I was personally present for the key portions of any procedures. I have documented in the chart those procedures where I was not present during the key portions. I have reviewed the emergency nurses triage note. I agree with the chief complaint, past medical history, past surgical history, allergies, medications, social and family history as documented unless otherwise noted below. Documentation of the HPI, Physical Exam and Medical Decision Making performed by medical students or scribes is based on my personal performance of the HPI, PE and MDM. For Physician Assistant/ Nurse Practitioner cases/documentation I have have had a face to face evaluation with this patient and have completed at least one if not all key elements of the E/M (history, physical exam, and MDM). Additional findings are as noted. Pertinent Comments       The care is provided during an unprecedented national emergency due to the novel coronavirus, COVID-19.     (Please note that portions of this note were completed with a voice recognition program.  Efforts were made to edit the dictations but occasionally words are mis-transcribed.)    Brandon Hill DO  Attending Emergency Physician         Brandon Hill DO  01/10/22 1084

## 2022-01-11 NOTE — ED NOTES
Patient to emergency department with complaints of toe pain/injury after dropping a keg on it approx.  1 hour ago     Matt Marie, AMALIA  01/10/22 5673

## 2022-01-11 NOTE — ED PROVIDER NOTES
[address]  Emergency Department Encounter  Emergency Medicine Resident     Pt Name: Matilde Crowder  KKY:[id number]  [place] 1985  Date of evaluation: 1/10/22  PCP:  Taty Fair MD    CHIEF COMPLAINT       Chief Complaint   Patient presents with    Toe Injury     HISTORY OF PRESENT ILLNESS  (Location/Symptom, Timing/Onset, Context/Setting, Quality, Duration, ModifyingFactors, Severity.)      Matilde Crowder is a 39 y.o. male who presents for evaluation of left foot pain. Prior to arrival patient was at work and dropped a keg on his foot. Attempted to stop bleeding with over-the-counter powder his last provided. No numbness, weakness, tingling. Able to bear weight. Unknown last tetanus. Patient did not fall or hit his head, no other injuries    PAST MEDICAL / SURGICAL / SOCIAL /FAMILY HISTORY      has a past medical history of Diabetes ([facility] [address] No other pertinent PMH on review with patient/guardian. has a past surgical history that includes knee surgery and Abdomen surgery. No other pertinent PSH on review with patient/guardian.   Social History     Socioeconomic History    Marital status: Single     Spouse name: Not on file    Number of children: Not on file    Years of education: Not on file    Highest education level: Not on file   Occupational History    Not on file   Tobacco Use    Smoking status: Former Smoker     Types: Cigarettes     Quit date: 2020     Years since quittin.6    Smokeless tobacco: Never Used   Substance and Sexual Activity    Alcohol use: Yes     Comment: socially    Drug use: Yes     Types: Marijuana Fadia Lr    Sexual activity: Not on file   Other Topics Concern    Not on file   Social History Narrative    Not on file     Social Determinants of Health     Financial Resource Strain: Low Risk     Difficulty of Paying Living Expenses: Not very hard   Food Insecurity: No Food Insecurity    Worried About Running Out of Food in the Last Year: Never true    Ran Out of Food in the Last Year: Never true   Transportation Needs:     Lack of Transportation (Medical): Not on file    Lack of Transportation (Non-Medical): Not on file   Physical Activity:     Days of Exercise per Week: Not on file    Minutes of Exercise per Session: Not on file   Stress:     Feeling of Stress : Not on file   Social Connections:     Frequency of Communication with Friends and Family: Not on file    Frequency of Social Gatherings with Friends and Family: Not on file    Attends Anabaptist Services: Not on file    Active Member of [address] [place] [address] [employer] or Organizations: Not on file    Attends Club or Organization Meetings: Not on file    Marital Status: Not on file   Intimate Partner Violence:     Fear of Current or Ex-Partner: Not on file    Emotionally Abused: Not on file    Physically Abused: Not on file    Sexually Abused: Not on file   Housing Stability:     Unable to Pay for Housing in the Last Year: Not on file    Number of [place] in the Last Year: Not on file    Unstable Housing in the Last Year: Not on file       I counseled the patient against using tobacco products. History reviewed. No pertinent family history. No other pertinent FamHx on review with patient/guardian. Allergies:  Patient has no known allergies. Home Medications:  Prior to Admission medications    Medication Sig Start Date End Date Taking?  Authorizing Provider   cephALEXin (KEFLEX) 500 MG capsule Take 1 capsule by mouth 2 times daily for 7 days 1/11/22 1/18/22 Yes Almita Pineda DO   insulin glargine (LANTUS SOLOSTAR) 100 UNIT/ML injection pen Inject 20 Units into the skin nightly 10/5/21   ARA Lee CNP   glimepiride (AMARYL) 2 MG tablet Take 1 tablet by mouth every morning 10/5/21   ARA Lee - CNP   metFORMIN (GLUCOPHAGE) 1000 MG tablet Take 1 tablet by mouth 2 times daily (with meals) 9/1/21   Lyndsay Gunter MD   lisinopril (PRINIVIL;ZESTRIL) 5 MG tablet Take 1 tablet by mouth daily 9/1/21   Latisha Person MD   fenofibrate (TRICOR) 54 MG tablet Take 1 tablet by mouth daily s [facility]: Please dispense generic fenofibrate unless prescriber denote 9/1/21   Latisha Person MD   TRUEPLUS PEN NEEDLES 31G X 8 MM MISC USE NIGHTLY WITH LANTUS 12/1/20   Latisha Person MD   glucose monitoring kit (FREESTYLE) monitoring kit 1 kit by Does not apply route daily 5/10/20   Ruthann Richmond MD   blood glucose monitor strips Test 1 times a day & as needed for symptoms of irregular blood glucose. 5/10/20   Ruthann Richmond MD       REVIEW OF SYSTEMS    (2-9 systems for level 4, 10 ormore for level 5)      Review of Systems   Constitutional: Negative for fever. Eyes: Negative for visual disturbance. Respiratory: Negative for shortness of breath. Cardiovascular: Negative for chest pain. Gastrointestinal: Negative for abdominal pain, nausea and vomiting. Genitourinary: Negative for hematuria. Musculoskeletal: Negative for back pain and neck pain. Left foot pain   Skin: Negative for wound. Allergic/Immunologic: Negative for immunocompromised state. Neurological: Negative for dizziness, weakness, numbness and headaches. Hematological: Does not bruise/bleed easily. Psychiatric/Behavioral: Negative for confusion. PHYSICAL EXAM   (up to 7 for level 4, 8 or more for level 5)      INITIAL VITALS:   BP (!) 176/111   Pulse 87   Temp 98.1 °F (36.7 °C)   Resp 18   Ht 5' 7\" (1.702 m)   Wt 240 lb (108.9 kg)   SpO2 100%   BMI 37.59 kg/m²     Physical Exam  Constitutional:       General: He is not in acute distress. Appearance: Normal appearance. He is not ill-appearing, toxic-appearing or diaphoretic. HENT:      Head: Normocephalic and atraumatic. Right Ear: External ear normal.      Left Ear: External ear normal.   Eyes:      General:         Right eye: No discharge. Left eye: No discharge.    Cardiovascular:      Rate and Rhythm: Normal rate. Pulmonary:      Effort: Pulmonary effort is normal. No respiratory distress. Musculoskeletal:      Comments: 2 cm laceration between first and second digit. No tenderness of foot. Pedal pulses 2+. 5/5 strength with plantar flexion/dorsiflexion. Neurological:      General: No focal deficit present. Mental Status: He is alert. DIFFERENTIAL  DIAGNOSIS     PLAN (LABS / IMAGING / EKG):  Orders Placed This Encounter   Procedures    XR FOOT LEFT (MIN 3 VIEWS)    Vital signs    [employer] Post Op Shoe, Unisex - Left; LG (M9.5-12/F10.5-13)       MEDICATIONS ORDERED:  Orders Placed This Encounter   Medications    Tetanus-Diphth-Acell Pertussis (BOOSTRIX) injection 0.5 mL    cephALEXin (KEFLEX) 500 MG capsule     Sig: Take 1 capsule by mouth 2 times daily for 7 days     Dispense:  14 capsule     Refill:  0     DIAGNOSTIC RESULTS / EMERGENCY DEPARTMENT COURSE / MDM     LABS:  No results found for this visit on 01/10/22. IMPRESSION/MDM/ED COURSE:  39 y.o. male presented with left foot pain after playing a keg on it at work. Patient hypertensive but vitals otherwise WNL. On exam patient resting calmly no acute distress, nontoxic-appearing. 2 cm laceration between first and second digit. No tenderness of foot. Will obtain x-ray and closed wound with sutures. Tetanus updated today. ED Course as of 01/11/22 0119   Tue Jan 11, 2022   0002 IMPRESSION:  Acute nondisplaced fracture involving the base of the 1st distal phalanx. [AF]   0111 Wound closed using sutures. Fracture is not immediately adjacent to laceration however given patient is diabetic will cover with Keflex. Discussed wound care. Strict glucose control and smoking cessation. Rigo tape and postop boot. Follow-up with podiatry soon as possible. I discussed signs and symptoms that would require reevaluation in the ED. The patient expressed understanding and agreement with plan.   All questions answered. [AF]      ED Course User Index  [AF] Una Mock DO     RADIOLOGY:  XR FOOT LEFT (MIN 3 VIEWS)   Final Result   Acute nondisplaced fracture involving the base of the 1st distal phalanx. EKG  None    All EKG's are interpreted by the Emergency Department Physician who either signs or Co-signs this chart in the absence of a cardiologist.    PROCEDURES:  PROCEDURE NOTE - LACERATION CLOSURE    PATIENT NAME: [address] [place] [address]. [id number]  DATE: 1/11/2022  ATTENDING PHYSICIAN: Dr. Petey Ramna DIAGNOSIS: Laceration(s) as follows:  -Location: L foot  -Length: 2 cm  -Layered closure: No    POSTOPERATIVE DIAGNOSIS:  Same  PROCEDURE PERFORMED:  Suture closure of laceration  PERFORMING PHYSICIAN: Una Mock DO  ANESTHESIA:  Local utilizing  Lidocaine 1% without epinephrine  ESTIMATED BLOOD LOSS:  Less than 25 ml. DISCUSSION:  Frantz Au is a 39y.o.-year-old male. Patient requires laceration repair. The history and physical examination were reviewed and confirmed. CONSENT: The patient provided verbal consent for this procedure. PROCEDURE:  Prior to starting, the procedure and patient were confirmed by those present. The wound area was irrigated with sterile saline and draped in a sterile fashion. The wound area was anesthetized with Lidocaine 1% without epinephrine. The wound was explored with the following results No foreign bodies found. The wound was repaired with 4-0 Vicryl using interrupted sutures. The wound was dressed with bacitracin. All sponge, instrument and needle counts were correct at the completion of the procedure. The patient tolerated the procedure well. SUTURE COUNT:  Suture count: 4    COMPLICATIONS:  None     Una Mock DO  1:14 AM, 1/10/22    CONSULTS:  None    FINAL IMPRESSION      1.  Nondisplaced fracture of distal phalanx of left great toe, initial encounter for open fracture DISPOSITION / PLAN     DISPOSITION Decision To Discharge 01/11/2022 12:51:02 AM      PATIENT REFERREDTO:  [facility]  [address] [place] [id number]  [phone]  Schedule an appointment as soon as possible for a visit         DISCHARGE MEDICATIONS:  New Prescriptions    CEPHALEXIN (KEFLEX) 500 MG CAPSULE    Take 1 capsule by mouth 2 times daily for 7 days       Shannon Morales DO  PGY 2  Resident Physician Emergency Medicine  01/11/22 1:14 AM    (Please note that portions of this note were completed with a voice recognition program.Efforts were made to edit the dictations but occasionally words are mis-transcribed.)       Sloane Andrade DO  Resident  01/11/22 2225

## 2022-01-24 ENCOUNTER — OFFICE VISIT (OUTPATIENT)
Dept: PODIATRY | Age: 37
End: 2022-01-24
Payer: COMMERCIAL

## 2022-01-24 VITALS
WEIGHT: 240 LBS | SYSTOLIC BLOOD PRESSURE: 119 MMHG | DIASTOLIC BLOOD PRESSURE: 80 MMHG | HEIGHT: 67 IN | HEART RATE: 77 BPM | BODY MASS INDEX: 37.67 KG/M2

## 2022-01-24 DIAGNOSIS — S92.425A CLOSED NONDISPLACED FRACTURE OF DISTAL PHALANX OF LEFT GREAT TOE, INITIAL ENCOUNTER: Primary | ICD-10-CM

## 2022-01-24 DIAGNOSIS — I10 ESSENTIAL HYPERTENSION: ICD-10-CM

## 2022-01-24 PROCEDURE — 99202 OFFICE O/P NEW SF 15 MIN: CPT

## 2022-01-24 PROCEDURE — 99203 OFFICE O/P NEW LOW 30 MIN: CPT
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
## 2023-02-01 ENCOUNTER — HOSPITAL ENCOUNTER (EMERGENCY)
Age: 38
Discharge: HOME | End: 2023-02-01
Attending: EMERGENCY MEDICINE
Payer: COMMERCIAL

## 2023-02-01 VITALS
HEIGHT: 67 IN | DIASTOLIC BLOOD PRESSURE: 98 MMHG | BODY MASS INDEX: 39.24 KG/M2 | OXYGEN SATURATION: 98 % | SYSTOLIC BLOOD PRESSURE: 144 MMHG | TEMPERATURE: 98.2 F | RESPIRATION RATE: 15 BRPM | WEIGHT: 250 LBS | HEART RATE: 77 BPM

## 2023-02-01 DIAGNOSIS — K04.7 DENTAL INFECTION: ICD-10-CM

## 2023-02-01 DIAGNOSIS — K08.89 PAIN, DENTAL: Primary | ICD-10-CM

## 2023-02-01 PROCEDURE — 99283 EMERGENCY DEPT VISIT LOW MDM: CPT

## 2023-02-01 PROCEDURE — 6370000000 HC RX 637 (ALT 250 FOR IP): Performed by: EMERGENCY MEDICINE

## 2023-02-01 RX ORDER — AMOXICILLIN AND CLAVULANATE POTASSIUM 875; 125 MG/1; MG/1
1 TABLET, FILM COATED ORAL 2 TIMES DAILY
Qty: 14 TABLET | Refills: 0 | Status: SHIPPED | OUTPATIENT
Start: 2023-02-01 | End: 2023-02-08

## 2023-02-01 RX ORDER — AMOXICILLIN AND CLAVULANATE POTASSIUM 875; 125 MG/1; MG/1
1 TABLET, FILM COATED ORAL ONCE
Status: COMPLETED | OUTPATIENT
Start: 2023-02-01 | End: 2023-02-01

## 2023-02-01 RX ADMIN — AMOXICILLIN AND CLAVULANATE POTASSIUM 1 TABLET: 875; 125 TABLET, FILM COATED ORAL at 06:00

## 2023-02-01 ASSESSMENT — LIFESTYLE VARIABLES
HOW MANY STANDARD DRINKS CONTAINING ALCOHOL DO YOU HAVE ON A TYPICAL DAY: PATIENT DOES NOT DRINK
HOW OFTEN DO YOU HAVE A DRINK CONTAINING ALCOHOL: NEVER

## 2023-02-01 ASSESSMENT — PAIN DESCRIPTION - PAIN TYPE: TYPE: ACUTE PAIN

## 2023-02-01 ASSESSMENT — PAIN SCALES - GENERAL: PAINLEVEL_OUTOF10: 9

## 2023-02-01 ASSESSMENT — PAIN DESCRIPTION - FREQUENCY: FREQUENCY: CONTINUOUS

## 2023-02-01 ASSESSMENT — ENCOUNTER SYMPTOMS
SHORTNESS OF BREATH: 0
COUGH: 0
PHOTOPHOBIA: 0
DIARRHEA: 0
NAUSEA: 0
COLOR CHANGE: 0
VOMITING: 0
TROUBLE SWALLOWING: 0
ABDOMINAL PAIN: 0

## 2023-02-01 ASSESSMENT — PAIN DESCRIPTION - ORIENTATION: ORIENTATION: LOWER

## 2023-02-01 ASSESSMENT — PAIN DESCRIPTION - DESCRIPTORS: DESCRIPTORS: ACHING

## 2023-02-01 ASSESSMENT — PAIN DESCRIPTION - LOCATION: LOCATION: TEETH

## 2023-02-01 ASSESSMENT — PAIN - FUNCTIONAL ASSESSMENT: PAIN_FUNCTIONAL_ASSESSMENT: 0-10

## 2023-02-01 NOTE — ED TRIAGE NOTES
Mode of arrival (squad #, walk in, police, etc) : walk in        Chief complaint(s): Dental pain        Arrival Note (brief scenario, treatment PTA, etc). : Lower dental pain for 8 days. C= \"Have you ever felt that you should Cut down on your drinking? \"  no  A= \"Have people Annoyed you by criticizing your drinking? \"  no  G= \"Have you ever felt bad or Guilty about your drinking? \"  no  E= \"Have you ever had a drink as an Eye-opener first thing in the morning to steady your nerves or to help a hangover? \"  no      Deferred []      Reason for deferring:     *If yes to two or more: probable alcohol abuse. *
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
